# Patient Record
Sex: FEMALE | Race: BLACK OR AFRICAN AMERICAN | NOT HISPANIC OR LATINO | Employment: OTHER | URBAN - METROPOLITAN AREA
[De-identification: names, ages, dates, MRNs, and addresses within clinical notes are randomized per-mention and may not be internally consistent; named-entity substitution may affect disease eponyms.]

---

## 2023-11-27 ENCOUNTER — OFFICE VISIT (OUTPATIENT)
Dept: PRIMARY CARE CLINIC | Facility: CLINIC | Age: 68
End: 2023-11-27
Payer: MEDICARE

## 2023-11-27 VITALS
RESPIRATION RATE: 19 BRPM | OXYGEN SATURATION: 96 % | WEIGHT: 146.19 LBS | SYSTOLIC BLOOD PRESSURE: 148 MMHG | HEIGHT: 62 IN | DIASTOLIC BLOOD PRESSURE: 70 MMHG | HEART RATE: 60 BPM | BODY MASS INDEX: 26.9 KG/M2

## 2023-11-27 DIAGNOSIS — Z95.5 HISTORY OF CORONARY ARTERY STENT PLACEMENT: ICD-10-CM

## 2023-11-27 DIAGNOSIS — I10 ESSENTIAL HYPERTENSION: ICD-10-CM

## 2023-11-27 DIAGNOSIS — E74.819 DISORDERS OF GLUCOSE TRANSPORT, UNSPECIFIED: ICD-10-CM

## 2023-11-27 DIAGNOSIS — E78.00 HYPERCHOLESTEROLEMIA: ICD-10-CM

## 2023-11-27 DIAGNOSIS — Z23 NEED FOR VACCINATION: ICD-10-CM

## 2023-11-27 DIAGNOSIS — Z76.89 ENCOUNTER TO ESTABLISH CARE: Primary | ICD-10-CM

## 2023-11-27 DIAGNOSIS — Z11.4 ENCOUNTER FOR SCREENING FOR HIV: ICD-10-CM

## 2023-11-27 DIAGNOSIS — Z11.59 NEED FOR HEPATITIS C SCREENING TEST: ICD-10-CM

## 2023-11-27 DIAGNOSIS — G57.11 MERALGIA PARAESTHETICA, RIGHT: ICD-10-CM

## 2023-11-27 PROBLEM — M51.369 DEGENERATION OF LUMBAR INTERVERTEBRAL DISC: Status: ACTIVE | Noted: 2022-10-12

## 2023-11-27 PROBLEM — M51.36 DEGENERATION OF LUMBAR INTERVERTEBRAL DISC: Status: ACTIVE | Noted: 2022-10-12

## 2023-11-27 PROBLEM — I25.9 ISCHEMIC HEART DISEASE: Status: ACTIVE | Noted: 2021-11-11

## 2023-11-27 PROBLEM — M25.561 PAIN IN RIGHT KNEE: Status: ACTIVE | Noted: 2022-11-10

## 2023-11-27 PROBLEM — E55.9 VITAMIN D DEFICIENCY: Status: ACTIVE | Noted: 2022-10-09

## 2023-11-27 PROCEDURE — 93005 EKG 12-LEAD: ICD-10-PCS | Mod: S$GLB,,, | Performed by: STUDENT IN AN ORGANIZED HEALTH CARE EDUCATION/TRAINING PROGRAM

## 2023-11-27 PROCEDURE — G0008 FLU VACCINE - QUADRIVALENT - ADJUVANTED: ICD-10-PCS | Mod: S$GLB,,, | Performed by: STUDENT IN AN ORGANIZED HEALTH CARE EDUCATION/TRAINING PROGRAM

## 2023-11-27 PROCEDURE — 99204 OFFICE O/P NEW MOD 45 MIN: CPT | Mod: S$GLB,,, | Performed by: STUDENT IN AN ORGANIZED HEALTH CARE EDUCATION/TRAINING PROGRAM

## 2023-11-27 PROCEDURE — 1159F MED LIST DOCD IN RCRD: CPT | Mod: CPTII,S$GLB,, | Performed by: STUDENT IN AN ORGANIZED HEALTH CARE EDUCATION/TRAINING PROGRAM

## 2023-11-27 PROCEDURE — 3288F PR FALLS RISK ASSESSMENT DOCUMENTED: ICD-10-PCS | Mod: CPTII,S$GLB,, | Performed by: STUDENT IN AN ORGANIZED HEALTH CARE EDUCATION/TRAINING PROGRAM

## 2023-11-27 PROCEDURE — 3077F PR MOST RECENT SYSTOLIC BLOOD PRESSURE >= 140 MM HG: ICD-10-PCS | Mod: CPTII,S$GLB,, | Performed by: STUDENT IN AN ORGANIZED HEALTH CARE EDUCATION/TRAINING PROGRAM

## 2023-11-27 PROCEDURE — 3288F FALL RISK ASSESSMENT DOCD: CPT | Mod: CPTII,S$GLB,, | Performed by: STUDENT IN AN ORGANIZED HEALTH CARE EDUCATION/TRAINING PROGRAM

## 2023-11-27 PROCEDURE — 3078F DIAST BP <80 MM HG: CPT | Mod: CPTII,S$GLB,, | Performed by: STUDENT IN AN ORGANIZED HEALTH CARE EDUCATION/TRAINING PROGRAM

## 2023-11-27 PROCEDURE — 93010 ELECTROCARDIOGRAM REPORT: CPT | Mod: S$GLB,,, | Performed by: INTERNAL MEDICINE

## 2023-11-27 PROCEDURE — 93005 ELECTROCARDIOGRAM TRACING: CPT | Mod: S$GLB,,, | Performed by: STUDENT IN AN ORGANIZED HEALTH CARE EDUCATION/TRAINING PROGRAM

## 2023-11-27 PROCEDURE — G0008 ADMIN INFLUENZA VIRUS VAC: HCPCS | Mod: S$GLB,,, | Performed by: STUDENT IN AN ORGANIZED HEALTH CARE EDUCATION/TRAINING PROGRAM

## 2023-11-27 PROCEDURE — 3008F PR BODY MASS INDEX (BMI) DOCUMENTED: ICD-10-PCS | Mod: CPTII,S$GLB,, | Performed by: STUDENT IN AN ORGANIZED HEALTH CARE EDUCATION/TRAINING PROGRAM

## 2023-11-27 PROCEDURE — 3078F PR MOST RECENT DIASTOLIC BLOOD PRESSURE < 80 MM HG: ICD-10-PCS | Mod: CPTII,S$GLB,, | Performed by: STUDENT IN AN ORGANIZED HEALTH CARE EDUCATION/TRAINING PROGRAM

## 2023-11-27 PROCEDURE — 99204 PR OFFICE/OUTPT VISIT, NEW, LEVL IV, 45-59 MIN: ICD-10-PCS | Mod: S$GLB,,, | Performed by: STUDENT IN AN ORGANIZED HEALTH CARE EDUCATION/TRAINING PROGRAM

## 2023-11-27 PROCEDURE — 99999 PR PBB SHADOW E&M-NEW PATIENT-LVL IV: ICD-10-PCS | Mod: PBBFAC,,, | Performed by: STUDENT IN AN ORGANIZED HEALTH CARE EDUCATION/TRAINING PROGRAM

## 2023-11-27 PROCEDURE — 90694 VACC AIIV4 NO PRSRV 0.5ML IM: CPT | Mod: S$GLB,,, | Performed by: STUDENT IN AN ORGANIZED HEALTH CARE EDUCATION/TRAINING PROGRAM

## 2023-11-27 PROCEDURE — 1101F PR PT FALLS ASSESS DOC 0-1 FALLS W/OUT INJ PAST YR: ICD-10-PCS | Mod: CPTII,S$GLB,, | Performed by: STUDENT IN AN ORGANIZED HEALTH CARE EDUCATION/TRAINING PROGRAM

## 2023-11-27 PROCEDURE — 3008F BODY MASS INDEX DOCD: CPT | Mod: CPTII,S$GLB,, | Performed by: STUDENT IN AN ORGANIZED HEALTH CARE EDUCATION/TRAINING PROGRAM

## 2023-11-27 PROCEDURE — 1160F PR REVIEW ALL MEDS BY PRESCRIBER/CLIN PHARMACIST DOCUMENTED: ICD-10-PCS | Mod: CPTII,S$GLB,, | Performed by: STUDENT IN AN ORGANIZED HEALTH CARE EDUCATION/TRAINING PROGRAM

## 2023-11-27 PROCEDURE — 1160F RVW MEDS BY RX/DR IN RCRD: CPT | Mod: CPTII,S$GLB,, | Performed by: STUDENT IN AN ORGANIZED HEALTH CARE EDUCATION/TRAINING PROGRAM

## 2023-11-27 PROCEDURE — 93010 EKG 12-LEAD: ICD-10-PCS | Mod: S$GLB,,, | Performed by: INTERNAL MEDICINE

## 2023-11-27 PROCEDURE — 1101F PT FALLS ASSESS-DOCD LE1/YR: CPT | Mod: CPTII,S$GLB,, | Performed by: STUDENT IN AN ORGANIZED HEALTH CARE EDUCATION/TRAINING PROGRAM

## 2023-11-27 PROCEDURE — 99999 PR PBB SHADOW E&M-NEW PATIENT-LVL IV: CPT | Mod: PBBFAC,,, | Performed by: STUDENT IN AN ORGANIZED HEALTH CARE EDUCATION/TRAINING PROGRAM

## 2023-11-27 PROCEDURE — 1159F PR MEDICATION LIST DOCUMENTED IN MEDICAL RECORD: ICD-10-PCS | Mod: CPTII,S$GLB,, | Performed by: STUDENT IN AN ORGANIZED HEALTH CARE EDUCATION/TRAINING PROGRAM

## 2023-11-27 PROCEDURE — 3077F SYST BP >= 140 MM HG: CPT | Mod: CPTII,S$GLB,, | Performed by: STUDENT IN AN ORGANIZED HEALTH CARE EDUCATION/TRAINING PROGRAM

## 2023-11-27 PROCEDURE — 90694 FLU VACCINE - QUADRIVALENT - ADJUVANTED: ICD-10-PCS | Mod: S$GLB,,, | Performed by: STUDENT IN AN ORGANIZED HEALTH CARE EDUCATION/TRAINING PROGRAM

## 2023-11-27 RX ORDER — METOPROLOL SUCCINATE 25 MG/1
25 TABLET, EXTENDED RELEASE ORAL DAILY
COMMUNITY
End: 2023-11-27 | Stop reason: SDUPTHER

## 2023-11-27 RX ORDER — ATORVASTATIN CALCIUM 80 MG/1
80 TABLET, FILM COATED ORAL DAILY
COMMUNITY
Start: 2023-09-28 | End: 2023-11-27 | Stop reason: SDUPTHER

## 2023-11-27 RX ORDER — CLOPIDOGREL BISULFATE 75 MG/1
75 TABLET ORAL DAILY
COMMUNITY
Start: 2023-09-28 | End: 2023-11-27 | Stop reason: SDUPTHER

## 2023-11-27 RX ORDER — AMLODIPINE BESYLATE 5 MG/1
5 TABLET ORAL DAILY
Qty: 30 TABLET | Refills: 11 | Status: SHIPPED | OUTPATIENT
Start: 2023-11-27 | End: 2024-02-23 | Stop reason: SDUPTHER

## 2023-11-27 RX ORDER — CLOPIDOGREL BISULFATE 75 MG/1
75 TABLET ORAL DAILY
Qty: 90 TABLET | Refills: 3 | Status: SHIPPED | OUTPATIENT
Start: 2023-11-27

## 2023-11-27 RX ORDER — ISOSORBIDE MONONITRATE 30 MG/1
30 TABLET, EXTENDED RELEASE ORAL EVERY MORNING
Qty: 90 TABLET | Refills: 3 | Status: SHIPPED | OUTPATIENT
Start: 2023-11-27 | End: 2023-11-30 | Stop reason: SDUPTHER

## 2023-11-27 RX ORDER — AMLODIPINE BESYLATE 2.5 MG/1
2.5 TABLET ORAL DAILY
COMMUNITY
Start: 2023-09-06 | End: 2023-11-27

## 2023-11-27 RX ORDER — ATORVASTATIN CALCIUM 80 MG/1
80 TABLET, FILM COATED ORAL DAILY
Qty: 90 TABLET | Refills: 3 | Status: SHIPPED | OUTPATIENT
Start: 2023-11-27

## 2023-11-27 RX ORDER — NAPROXEN SODIUM 220 MG/1
81 TABLET, FILM COATED ORAL DAILY
COMMUNITY

## 2023-11-27 RX ORDER — RANOLAZINE 500 MG/1
500 TABLET, EXTENDED RELEASE ORAL DAILY
COMMUNITY
Start: 2023-11-01 | End: 2024-02-23 | Stop reason: SDUPTHER

## 2023-11-27 RX ORDER — METOPROLOL SUCCINATE 25 MG/1
25 TABLET, EXTENDED RELEASE ORAL DAILY
Qty: 90 TABLET | Refills: 3 | Status: SHIPPED | OUTPATIENT
Start: 2023-11-27

## 2023-11-27 RX ORDER — ISOSORBIDE MONONITRATE 30 MG/1
30 TABLET, EXTENDED RELEASE ORAL EVERY MORNING
COMMUNITY
Start: 2023-11-16 | End: 2023-11-27 | Stop reason: SDUPTHER

## 2023-11-27 NOTE — PATIENT INSTRUCTIONS
Est Care:   - 68-year-old female presenting to establish care.  Has history of coronary artery disease in 2021, requiring 4 stents.  Chronic hypertension.  Chronic angina.    HTN:   - blood pressure slightly elevated today.  Would recommend patient taking increase dose of amlodipine from 2.5 mg daily to 5 mg daily.  Other medicine should be continued unchanged.  Will recheck at nursing clinic visit 2 weeks as well as at our next follow-up visit.    Hx ischemic heart disease, CAD:   - patient had history of heart attack, with stents in September of 2021, x1.  Then October of 2021, x3.   - has been taking baby aspirin, atorvastatin, amlodipine, metoprolol, Imdur, Plavix and Ranexa daily.  Continue with current plan.  Needs to have cardiologist.  Referral placed.  Patient due for EKG which has been ordered today.  Additionally should get cholesterol, blood counts, kidney liver function and thyroid which have all been ordered.    Screening:    - patient had Cologuard 2 and half years ago, will need to repeat early in 2024.  Additionally will be due for repeat mammogram fairly soon.

## 2023-11-27 NOTE — PROGRESS NOTES
Verified pt ID using name and . NDKA. Administered influenza in right deltoid per physician order using aseptic technique.  Pt tolerated well with no adverse reactions noted.

## 2023-11-27 NOTE — PROGRESS NOTES
"Subjective:           Patient ID: Betty Farr is a 68 y.o. female who presents today with a chief complaint of [facility]  .    Chief Complaint:   Establish Care      History of Present Illness:    Betty Farr is a 68 y.o. female who presents today with a chief complaint of [facility]  .    69yo female presenting to [facility].    Hx of mild heart attack and on workup was found to have significant CAD.    Takes Amlodipine 2.5mg daily, Metoprolol succinate 25mg daily, IMDUR 30mg daily.   Also ASA 81mg, Atorvastatin 80mg daily, and Plavix 75mg daily.     Taking Ranexa for chest pain/angina since MI.    Had 4 stents placed, 1 in Sept 2021, then 3 more in Oct 2021.      Review of Systems   Constitutional:  Negative for activity change, fatigue, fever and unexpected weight change.   HENT:  Negative for congestion, nosebleeds, sinus pressure and sneezing.    Respiratory:  Negative for cough, shortness of breath and wheezing.    Cardiovascular:  Negative for chest pain, palpitations and leg swelling.   Gastrointestinal:  Negative for abdominal distention, constipation, diarrhea and nausea.   Genitourinary:  Negative for difficulty urinating and dysuria.   Musculoskeletal:  Negative for back pain and gait problem.   Skin:  Negative for pallor and rash.   Neurological:  Negative for weakness, numbness and headaches.   Psychiatric/Behavioral:  Negative for agitation. The patient is not nervous/anxious.            Objective:        Vitals:    11/27/23 1029 11/27/23 1101   BP: (!) 144/70 (!) 148/70   BP Location: Left arm Right arm   Patient Position: Sitting Sitting   BP Method: Medium (Manual) Medium (Manual)   Pulse: 60    Resp: 19    SpO2: 96%    Weight: 66.3 kg (146 lb 2.6 oz)    Height: 5' 2" (1.575 m)        Body mass index is 26.73 kg/m².      Physical Exam  Vitals reviewed.   Constitutional:       General: She is not in acute distress.     Appearance: Normal appearance. She is normal weight.   HENT:      " "Head: Normocephalic and atraumatic.      Right Ear: External ear normal.      Left Ear: External ear normal.      Nose: No rhinorrhea.      Mouth/Throat:      Mouth: Mucous membranes are moist.   Eyes:      Extraocular Movements: Extraocular movements intact.      Conjunctiva/sclera: Conjunctivae normal.   Cardiovascular:      Rate and Rhythm: Normal rate and regular rhythm.      Heart sounds: No murmur heard.  Pulmonary:      Effort: Pulmonary effort is normal. No respiratory distress.   Abdominal:      Tenderness: There is no right CVA tenderness or left CVA tenderness.   Musculoskeletal:      Right lower leg: No edema.      Left lower leg: No edema.   Lymphadenopathy:      Cervical: No cervical adenopathy.   Skin:     Capillary Refill: Capillary refill takes less than 2 seconds.      Coloration: Skin is not jaundiced.      Findings: No bruising.   Neurological:      General: No focal deficit present.      Mental Status: She is alert and oriented to person, place, and time.      Motor: No weakness.      Gait: Gait normal.   Psychiatric:         Mood and Affect: Mood normal.             No results found for: "NA", "K", "CL", "CO2", "BUN", "CREATININE", "GLUCOSE", "ANIONGAP"  No results found for: "HGBA1C"  No results found for: "BNP", "BNPTRIAGEBLO"    No results found for: "WBC", "HGB", "HCT", "PLT", "GRAN"  No results found for: "CHOL", "HDL", "LDLCALC", "TRIG"       Current Outpatient Medications:     aspirin 81 MG Chew, Take 81 mg by mouth once daily., Disp: , Rfl:     ranolazine (RANEXA) 500 MG Tb12, Take 500 mg by mouth Daily., Disp: , Rfl:     amLODIPine (NORVASC) 5 MG tablet, Take 1 tablet (5 mg total) by mouth once daily., Disp: 30 tablet, Rfl: 11    atorvastatin (LIPITOR) 80 MG tablet, Take 1 tablet (80 mg total) by mouth once daily., Disp: 90 tablet, Rfl: 3    clopidogreL (PLAVIX) 75 mg tablet, Take 1 tablet (75 mg total) by mouth once daily., Disp: 90 tablet, Rfl: 3    isosorbide mononitrate (IMDUR) 30 " MG 24 hr tablet, Take 1 tablet (30 mg total) by mouth every morning., Disp: 90 tablet, Rfl: 3    metoprolol succinate (TOPROL-XL) 25 MG 24 hr tablet, Take 1 tablet (25 mg total) by mouth once daily., Disp: 90 tablet, Rfl: 3     Outpatient Encounter Medications as of 11/27/2023   Medication Sig Dispense Refill    aspirin 81 MG Chew Take 81 mg by mouth once daily.      ranolazine (RANEXA) 500 MG Tb12 Take 500 mg by mouth Daily.      [DISCONTINUED] amLODIPine (NORVASC) 2.5 MG tablet Take 2.5 mg by mouth once daily.      [DISCONTINUED] atorvastatin (LIPITOR) 80 MG tablet Take 80 mg by mouth once daily.      [DISCONTINUED] clopidogreL (PLAVIX) 75 mg tablet Take 75 mg by mouth once daily.      [DISCONTINUED] isosorbide mononitrate (IMDUR) 30 MG 24 hr tablet Take 30 mg by mouth every morning.      [DISCONTINUED] metoprolol succinate (TOPROL-XL) 25 MG 24 hr tablet Take 25 mg by mouth once daily.      amLODIPine (NORVASC) 5 MG tablet Take 1 tablet (5 mg total) by mouth once daily. 30 tablet 11    atorvastatin (LIPITOR) 80 MG tablet Take 1 tablet (80 mg total) by mouth once daily. 90 tablet 3    clopidogreL (PLAVIX) 75 mg tablet Take 1 tablet (75 mg total) by mouth once daily. 90 tablet 3    isosorbide mononitrate (IMDUR) 30 MG 24 hr tablet Take 1 tablet (30 mg total) by mouth every morning. 90 tablet 3    metoprolol succinate (TOPROL-XL) 25 MG 24 hr tablet Take 1 tablet (25 mg total) by mouth once daily. 90 tablet 3     No facility-administered encounter medications on file as of 11/27/2023.          Assessment:       1. Encounter to establish care    2. Essential hypertension    3. History of coronary artery stent placement    4. Hypercholesterolemia    5. Meralgia paraesthetica, right    6. Need for vaccination    7. Need for hepatitis C screening test    8. Encounter for screening for HIV    9. Disorders of glucose transport, unspecified           Plan:       Encounter to establish care    Essential hypertension  -      amLODIPine (NORVASC) 5 MG tablet; Take 1 tablet (5 mg total) by mouth once daily.  Dispense: 30 tablet; Refill: 11  -     Ambulatory referral/consult to Cardiology; Future; Expected date: 12/04/2023  -     atorvastatin (LIPITOR) 80 MG tablet; Take 1 tablet (80 mg total) by mouth once daily.  Dispense: 90 tablet; Refill: 3  -     clopidogreL (PLAVIX) 75 mg tablet; Take 1 tablet (75 mg total) by mouth once daily.  Dispense: 90 tablet; Refill: 3  -     metoprolol succinate (TOPROL-XL) 25 MG 24 hr tablet; Take 1 tablet (25 mg total) by mouth once daily.  Dispense: 90 tablet; Refill: 3  -     isosorbide mononitrate (IMDUR) 30 MG 24 hr tablet; Take 1 tablet (30 mg total) by mouth every morning.  Dispense: 90 tablet; Refill: 3  -     CBC Auto Differential; Future; Expected date: 11/27/2023  -     Comprehensive Metabolic Panel; Future; Expected date: 11/27/2023  -     Lipid Panel; Future; Expected date: 11/27/2023  -     Hemoglobin A1C; Future; Expected date: 11/27/2023  -     TSH; Future; Expected date: 11/27/2023  -     T4, Free; Future; Expected date: 11/27/2023  -     EKG 12-lead    History of coronary artery stent placement  -     amLODIPine (NORVASC) 5 MG tablet; Take 1 tablet (5 mg total) by mouth once daily.  Dispense: 30 tablet; Refill: 11  -     Ambulatory referral/consult to Cardiology; Future; Expected date: 12/04/2023  -     atorvastatin (LIPITOR) 80 MG tablet; Take 1 tablet (80 mg total) by mouth once daily.  Dispense: 90 tablet; Refill: 3  -     clopidogreL (PLAVIX) 75 mg tablet; Take 1 tablet (75 mg total) by mouth once daily.  Dispense: 90 tablet; Refill: 3  -     metoprolol succinate (TOPROL-XL) 25 MG 24 hr tablet; Take 1 tablet (25 mg total) by mouth once daily.  Dispense: 90 tablet; Refill: 3  -     isosorbide mononitrate (IMDUR) 30 MG 24 hr tablet; Take 1 tablet (30 mg total) by mouth every morning.  Dispense: 90 tablet; Refill: 3  -     CBC Auto Differential; Future; Expected date: 11/27/2023  -      Comprehensive Metabolic Panel; Future; Expected date: 11/27/2023  -     Lipid Panel; Future; Expected date: 11/27/2023  -     Hemoglobin A1C; Future; Expected date: 11/27/2023  -     TSH; Future; Expected date: 11/27/2023  -     T4, Free; Future; Expected date: 11/27/2023  -     EKG 12-lead    Hypercholesterolemia  -     atorvastatin (LIPITOR) 80 MG tablet; Take 1 tablet (80 mg total) by mouth once daily.  Dispense: 90 tablet; Refill: 3  -     clopidogreL (PLAVIX) 75 mg tablet; Take 1 tablet (75 mg total) by mouth once daily.  Dispense: 90 tablet; Refill: 3  -     metoprolol succinate (TOPROL-XL) 25 MG 24 hr tablet; Take 1 tablet (25 mg total) by mouth once daily.  Dispense: 90 tablet; Refill: 3  -     isosorbide mononitrate (IMDUR) 30 MG 24 hr tablet; Take 1 tablet (30 mg total) by mouth every morning.  Dispense: 90 tablet; Refill: 3    Meralgia paraesthetica, right    Need for vaccination  -     Influenza (FLUAD) - Quadrivalent (Adjuvanted) *Preferred* (65+) (PF)    Need for hepatitis C screening test  -     Hepatitis C Antibody; Future; Expected date: 11/27/2023    Encounter for screening for HIV  -     HIV 1/2 Ag/Ab (4th Gen); Future; Expected date: 11/27/2023    Disorders of glucose transport, unspecified  -     Hemoglobin A1C; Future; Expected date: 11/27/2023               Est Care:   - 68-year-old female presenting to [facility] care.  Has history of coronary artery disease in 2021, requiring 4 stents.  Chronic hypertension.  Chronic angina.    HTN:   - blood pressure slightly elevated today.  Would recommend patient taking increase dose of amlodipine from 2.5 mg daily to 5 mg daily.  Other medicine should be continued unchanged.  Will recheck at nursing clinic visit 2 weeks as well as at our next follow-up visit.    Hx ischemic heart disease, CAD:   - patient had history of heart attack, with stents in September of 2021, x1.  Then October of 2021, x3.   - has been taking baby aspirin, atorvastatin,  amlodipine, metoprolol, Imdur, Plavix and Ranexa daily.  Continue with current plan.  Needs to have cardiologist.  Referral placed.  Patient due for EKG which has been ordered today.  Additionally should get cholesterol, blood counts, kidney liver function and thyroid which have all been ordered.    Screening:    - patient had Cologuard 2 and half years ago, will need to repeat early in 2024.  Additionally will be due for repeat mammogram fairly soon.

## 2023-11-29 DIAGNOSIS — Z78.0 MENOPAUSE: ICD-10-CM

## 2023-11-30 ENCOUNTER — OFFICE VISIT (OUTPATIENT)
Dept: CARDIOLOGY | Facility: CLINIC | Age: 68
End: 2023-11-30
Payer: MEDICARE

## 2023-11-30 VITALS
SYSTOLIC BLOOD PRESSURE: 116 MMHG | DIASTOLIC BLOOD PRESSURE: 55 MMHG | BODY MASS INDEX: 26.65 KG/M2 | WEIGHT: 144.81 LBS | OXYGEN SATURATION: 96 % | HEIGHT: 62 IN | HEART RATE: 68 BPM

## 2023-11-30 DIAGNOSIS — R07.2 PRECORDIAL PAIN: Primary | ICD-10-CM

## 2023-11-30 DIAGNOSIS — I10 ESSENTIAL HYPERTENSION: ICD-10-CM

## 2023-11-30 DIAGNOSIS — R00.2 PALPITATIONS: ICD-10-CM

## 2023-11-30 DIAGNOSIS — E78.00 HYPERCHOLESTEROLEMIA: ICD-10-CM

## 2023-11-30 DIAGNOSIS — Z95.5 HISTORY OF CORONARY ARTERY STENT PLACEMENT: ICD-10-CM

## 2023-11-30 PROCEDURE — 1159F MED LIST DOCD IN RCRD: CPT | Mod: CPTII,S$GLB,, | Performed by: NURSE PRACTITIONER

## 2023-11-30 PROCEDURE — 3044F HG A1C LEVEL LT 7.0%: CPT | Mod: CPTII,S$GLB,, | Performed by: NURSE PRACTITIONER

## 2023-11-30 PROCEDURE — 1160F RVW MEDS BY RX/DR IN RCRD: CPT | Mod: CPTII,S$GLB,, | Performed by: NURSE PRACTITIONER

## 2023-11-30 PROCEDURE — 99204 PR OFFICE/OUTPT VISIT, NEW, LEVL IV, 45-59 MIN: ICD-10-PCS | Mod: S$GLB,,, | Performed by: NURSE PRACTITIONER

## 2023-11-30 PROCEDURE — 3008F BODY MASS INDEX DOCD: CPT | Mod: CPTII,S$GLB,, | Performed by: NURSE PRACTITIONER

## 2023-11-30 PROCEDURE — 3074F SYST BP LT 130 MM HG: CPT | Mod: CPTII,S$GLB,, | Performed by: NURSE PRACTITIONER

## 2023-11-30 PROCEDURE — 1160F PR REVIEW ALL MEDS BY PRESCRIBER/CLIN PHARMACIST DOCUMENTED: ICD-10-PCS | Mod: CPTII,S$GLB,, | Performed by: NURSE PRACTITIONER

## 2023-11-30 PROCEDURE — 3044F PR MOST RECENT HEMOGLOBIN A1C LEVEL <7.0%: ICD-10-PCS | Mod: CPTII,S$GLB,, | Performed by: NURSE PRACTITIONER

## 2023-11-30 PROCEDURE — 3288F PR FALLS RISK ASSESSMENT DOCUMENTED: ICD-10-PCS | Mod: CPTII,S$GLB,, | Performed by: NURSE PRACTITIONER

## 2023-11-30 PROCEDURE — 3078F DIAST BP <80 MM HG: CPT | Mod: CPTII,S$GLB,, | Performed by: NURSE PRACTITIONER

## 2023-11-30 PROCEDURE — 1159F PR MEDICATION LIST DOCUMENTED IN MEDICAL RECORD: ICD-10-PCS | Mod: CPTII,S$GLB,, | Performed by: NURSE PRACTITIONER

## 2023-11-30 PROCEDURE — 3078F PR MOST RECENT DIASTOLIC BLOOD PRESSURE < 80 MM HG: ICD-10-PCS | Mod: CPTII,S$GLB,, | Performed by: NURSE PRACTITIONER

## 2023-11-30 PROCEDURE — 1101F PT FALLS ASSESS-DOCD LE1/YR: CPT | Mod: CPTII,S$GLB,, | Performed by: NURSE PRACTITIONER

## 2023-11-30 PROCEDURE — 99999 PR PBB SHADOW E&M-EST. PATIENT-LVL IV: ICD-10-PCS | Mod: PBBFAC,,, | Performed by: NURSE PRACTITIONER

## 2023-11-30 PROCEDURE — 3288F FALL RISK ASSESSMENT DOCD: CPT | Mod: CPTII,S$GLB,, | Performed by: NURSE PRACTITIONER

## 2023-11-30 PROCEDURE — 3074F PR MOST RECENT SYSTOLIC BLOOD PRESSURE < 130 MM HG: ICD-10-PCS | Mod: CPTII,S$GLB,, | Performed by: NURSE PRACTITIONER

## 2023-11-30 PROCEDURE — 1101F PR PT FALLS ASSESS DOC 0-1 FALLS W/OUT INJ PAST YR: ICD-10-PCS | Mod: CPTII,S$GLB,, | Performed by: NURSE PRACTITIONER

## 2023-11-30 PROCEDURE — 3008F PR BODY MASS INDEX (BMI) DOCUMENTED: ICD-10-PCS | Mod: CPTII,S$GLB,, | Performed by: NURSE PRACTITIONER

## 2023-11-30 PROCEDURE — 99999 PR PBB SHADOW E&M-EST. PATIENT-LVL IV: CPT | Mod: PBBFAC,,, | Performed by: NURSE PRACTITIONER

## 2023-11-30 PROCEDURE — 99204 OFFICE O/P NEW MOD 45 MIN: CPT | Mod: S$GLB,,, | Performed by: NURSE PRACTITIONER

## 2023-11-30 RX ORDER — ISOSORBIDE MONONITRATE 30 MG/1
60 TABLET, EXTENDED RELEASE ORAL EVERY MORNING
Qty: 90 TABLET | Refills: 3 | Status: SHIPPED | OUTPATIENT
Start: 2023-11-30

## 2023-11-30 NOTE — PROGRESS NOTES
Cardiology    11/30/2023  9:15 AM    Problem list  Patient Active Problem List   Diagnosis    Degeneration of lumbar intervertebral disc    Essential hypertension    History of coronary artery stent placement    Hypercholesterolemia    Vitamin D deficiency    Pain in right knee    Ischemic heart disease       CC:  Chest pain, establish care    HPI:  Left sided lightening like pains that happen when laying down.  Has diaphoresis associated. No swelling in legs, 3 pillows is her average which is for comfort. No ETOH, former tobacco user, no caffeine. Had a stent placed in [place] and recently moved back to the [facility]. Remains on DAPT.    Medications  Current Outpatient Medications   Medication Sig Dispense Refill    amLODIPine (NORVASC) 5 MG tablet Take 1 tablet (5 mg total) by mouth once daily. 30 tablet 11    aspirin 81 MG Chew Take 81 mg by mouth once daily.      atorvastatin (LIPITOR) 80 MG tablet Take 1 tablet (80 mg total) by mouth once daily. 90 tablet 3    clopidogreL (PLAVIX) 75 mg tablet Take 1 tablet (75 mg total) by mouth once daily. 90 tablet 3    isosorbide mononitrate (IMDUR) 30 MG 24 hr tablet Take 1 tablet (30 mg total) by mouth every morning. 90 tablet 3    metoprolol succinate (TOPROL-XL) 25 MG 24 hr tablet Take 1 tablet (25 mg total) by mouth once daily. 90 tablet 3    ranolazine (RANEXA) 500 MG Tb12 Take 500 mg by mouth Daily.       No current facility-administered medications for this visit.      Prior to Admission medications    Medication Sig Start Date End Date Taking? Authorizing Provider   amLODIPine (NORVASC) 5 MG tablet Take 1 tablet (5 mg total) by mouth once daily. 11/27/23 11/26/24 Yes Harsha Araujo MD   aspirin 81 MG Chew Take 81 mg by mouth once daily.   Yes Provider, Historical   atorvastatin (LIPITOR) 80 MG tablet Take 1 tablet (80 mg total) by mouth once daily. 11/27/23  Yes Harsha Araujo MD   clopidogreL (PLAVIX) 75 mg tablet Take 1 tablet (75 mg total) by  mouth once daily. 23  Yes Harsha Araujo MD   isosorbide mononitrate (IMDUR) 30 MG 24 hr tablet Take 1 tablet (30 mg total) by mouth every morning. 23  Yes Harsha Araujo MD   metoprolol succinate (TOPROL-XL) 25 MG 24 hr tablet Take 1 tablet (25 mg total) by mouth once daily. 23  Yes Harsha Araujo MD   ranolazine (RANEXA) 500 MG Tb12 Take 500 mg by mouth Daily. 23  Yes Provider, Historical         History  No past medical history on file.  Past Surgical History:   Procedure Laterality Date     SECTION      x3    CORONARY STENT PLACEMENT      4 stents, Sept 2021 x1,  Oct 2021 x 3    HYSTERECTOMY       Social History     Socioeconomic History    Marital status:    Tobacco Use    Smoking status: Former     Current packs/day: 0.00     Average packs/day: 1 pack/day for 47.7 years (47.7 ttl pk-yrs)     Types: Cigarettes     Start date: 1974     Quit date: 2021     Years since quittin.2     Passive exposure: Never    Smokeless tobacco: Never    Tobacco comments:     Stopped smoking in    Substance and Sexual Activity    Alcohol use: Never    Drug use: Never    Sexual activity: Not Currently         Allergies  Review of patient's allergies indicates:   Allergen Reactions    Penicillins          Review of Systems   Review of Systems   Constitutional: Negative for diaphoresis and malaise/fatigue.   HENT: Negative.     Cardiovascular:  Positive for chest pain, irregular heartbeat and palpitations. Negative for claudication, dyspnea on exertion, leg swelling, near-syncope, orthopnea, paroxysmal nocturnal dyspnea and syncope.   Respiratory:  Negative for shortness of breath.    Endocrine: Negative for polydipsia, polyphagia and polyuria.   Hematologic/Lymphatic: Does not bruise/bleed easily.   Gastrointestinal:  Negative for bloating, nausea and vomiting.   Genitourinary: Negative.    Neurological:  Negative for excessive daytime sleepiness, dizziness,  light-headedness, loss of balance and weakness.   Psychiatric/Behavioral:  The patient is not nervous/anxious.    Allergic/Immunologic: Negative.          Physical Exam  Wt Readings from Last 1 Encounters:   11/30/23 65.7 kg (144 lb 13.5 oz)     BP Readings from Last 3 Encounters:   11/30/23 (!) 116/55   11/27/23 (!) 148/70     Pulse Readings from Last 1 Encounters:   11/30/23 68     Body mass index is 26.49 kg/m².    Physical Exam  Vitals and nursing note reviewed.   Constitutional:       Appearance: Normal appearance.   HENT:      Head: Normocephalic and atraumatic.      Mouth/Throat:      Mouth: Mucous membranes are moist.   Eyes:      Pupils: Pupils are equal, round, and reactive to light.   Cardiovascular:      Rate and Rhythm: Normal rate and regular rhythm.      Pulses:           Radial pulses are 2+ on the right side and 2+ on the left side.        Dorsalis pedis pulses are 2+ on the right side and 2+ on the left side.        Posterior tibial pulses are 2+ on the right side and 2+ on the left side.      Heart sounds: No murmur heard.  Pulmonary:      Effort: Pulmonary effort is normal. No respiratory distress.      Breath sounds: Normal breath sounds.   Abdominal:      General: There is no distension.      Tenderness: There is no abdominal tenderness.   Musculoskeletal:      Cervical back: Normal range of motion.      Right lower leg: No edema.      Left lower leg: No edema.   Skin:     General: Skin is warm and dry.      Findings: No erythema.   Neurological:      General: No focal deficit present.      Mental Status: She is alert.   Psychiatric:         Mood and Affect: Mood normal.         Behavior: Behavior normal.       Problem List Items Addressed This Visit          Cardiac/Vascular    Essential hypertension    Overview     Stable  Continue current medications  Monitor         Current Assessment & Plan     As above         Relevant Medications    isosorbide mononitrate (IMDUR) 30 MG 24 hr tablet     Other Relevant Orders    Nuclear Stress Test    History of coronary artery stent placement    Overview     Await records, remains on DAPT         Current Assessment & Plan     As above         Relevant Medications    isosorbide mononitrate (IMDUR) 30 MG 24 hr tablet    Other Relevant Orders    NM Myocardial Perfusion Spect Multi Exer    Nuclear Stress Test    Hypercholesterolemia    Relevant Medications    isosorbide mononitrate (IMDUR) 30 MG 24 hr tablet    Precordial pain - Primary    Overview     Significant CAD history with need for PCI  Increase Imdur dose to 60 mg  Await nuclear stress           Current Assessment & Plan     As above         Relevant Orders    NM Myocardial Perfusion Spect Multi Exer    Nuclear Stress Test     Other Visit Diagnoses       Palpitations        Relevant Orders    Holter monitor - 48 hour                        Follow Up  4 weeks      @Balbina Grijalva DNP

## 2023-12-01 PROBLEM — R07.2 PRECORDIAL PAIN: Status: ACTIVE | Noted: 2023-12-01

## 2023-12-04 ENCOUNTER — TELEPHONE (OUTPATIENT)
Dept: PRIMARY CARE CLINIC | Facility: CLINIC | Age: 68
End: 2023-12-04
Payer: MEDICARE

## 2023-12-04 NOTE — TELEPHONE ENCOUNTER
----- Message from Cesar Conde sent at 12/4/2023  1:50 PM CST -----  Contact: [phone]@[email or web address]  Good afternoon patient would like a call back she says she missed a call from someone in the office . Please give patient a call back [phone]

## 2023-12-11 ENCOUNTER — CLINICAL SUPPORT (OUTPATIENT)
Dept: PRIMARY CARE CLINIC | Facility: CLINIC | Age: 68
End: 2023-12-11
Payer: MEDICARE

## 2023-12-11 VITALS — SYSTOLIC BLOOD PRESSURE: 126 MMHG | OXYGEN SATURATION: 96 % | HEART RATE: 68 BPM | DIASTOLIC BLOOD PRESSURE: 60 MMHG

## 2023-12-11 DIAGNOSIS — I10 ESSENTIAL HYPERTENSION: Primary | ICD-10-CM

## 2023-12-11 PROCEDURE — 99999 PR PBB SHADOW E&M-EST. PATIENT-LVL I: ICD-10-PCS | Mod: PBBFAC,,,

## 2023-12-11 PROCEDURE — 99999 PR PBB SHADOW E&M-EST. PATIENT-LVL I: CPT | Mod: PBBFAC,,,

## 2023-12-11 NOTE — PROGRESS NOTES
Verified pt ID using name and . Obtained bp:126/60 , p, and sp02. Notified physician of results. Instructed pt to continue to take bp medication . Pt verbalized understanding

## 2024-01-02 ENCOUNTER — TELEPHONE (OUTPATIENT)
Dept: CARDIOLOGY | Facility: CLINIC | Age: 69
End: 2024-01-02
Payer: MEDICARE

## 2024-01-02 NOTE — TELEPHONE ENCOUNTER
----- Message from Balbina Grijalva DNP sent at 1/2/2024  1:24 PM CST -----  Please contact the patient and let them know that their results were fine and do not require any change in treatment.

## 2024-01-11 ENCOUNTER — TELEPHONE (OUTPATIENT)
Dept: CARDIOLOGY | Facility: CLINIC | Age: 69
End: 2024-01-11
Payer: MEDICARE

## 2024-01-11 ENCOUNTER — OFFICE VISIT (OUTPATIENT)
Dept: CARDIOLOGY | Facility: CLINIC | Age: 69
End: 2024-01-11
Payer: MEDICARE

## 2024-01-11 VITALS
DIASTOLIC BLOOD PRESSURE: 69 MMHG | WEIGHT: 142.75 LBS | SYSTOLIC BLOOD PRESSURE: 135 MMHG | BODY MASS INDEX: 26.27 KG/M2 | HEART RATE: 58 BPM | HEIGHT: 62 IN | OXYGEN SATURATION: 96 %

## 2024-01-11 DIAGNOSIS — R60.9 EDEMA, UNSPECIFIED TYPE: ICD-10-CM

## 2024-01-11 DIAGNOSIS — I10 ESSENTIAL HYPERTENSION: ICD-10-CM

## 2024-01-11 DIAGNOSIS — S40.022A CONTUSION OF LEFT UPPER EXTREMITY, INITIAL ENCOUNTER: ICD-10-CM

## 2024-01-11 DIAGNOSIS — T14.8XXA HEMATOMA: Primary | ICD-10-CM

## 2024-01-11 DIAGNOSIS — Z95.5 HISTORY OF CORONARY ARTERY STENT PLACEMENT: ICD-10-CM

## 2024-01-11 PROCEDURE — 3288F FALL RISK ASSESSMENT DOCD: CPT | Mod: CPTII,S$GLB,, | Performed by: NURSE PRACTITIONER

## 2024-01-11 PROCEDURE — 99999 PR PBB SHADOW E&M-EST. PATIENT-LVL IV: CPT | Mod: PBBFAC,,, | Performed by: NURSE PRACTITIONER

## 2024-01-11 PROCEDURE — 3078F DIAST BP <80 MM HG: CPT | Mod: CPTII,S$GLB,, | Performed by: NURSE PRACTITIONER

## 2024-01-11 PROCEDURE — 1159F MED LIST DOCD IN RCRD: CPT | Mod: CPTII,S$GLB,, | Performed by: NURSE PRACTITIONER

## 2024-01-11 PROCEDURE — 99212 OFFICE O/P EST SF 10 MIN: CPT | Mod: S$GLB,,, | Performed by: NURSE PRACTITIONER

## 2024-01-11 PROCEDURE — 1125F AMNT PAIN NOTED PAIN PRSNT: CPT | Mod: CPTII,S$GLB,, | Performed by: NURSE PRACTITIONER

## 2024-01-11 PROCEDURE — 1160F RVW MEDS BY RX/DR IN RCRD: CPT | Mod: CPTII,S$GLB,, | Performed by: NURSE PRACTITIONER

## 2024-01-11 PROCEDURE — 3075F SYST BP GE 130 - 139MM HG: CPT | Mod: CPTII,S$GLB,, | Performed by: NURSE PRACTITIONER

## 2024-01-11 PROCEDURE — 1101F PT FALLS ASSESS-DOCD LE1/YR: CPT | Mod: CPTII,S$GLB,, | Performed by: NURSE PRACTITIONER

## 2024-01-11 PROCEDURE — 3008F BODY MASS INDEX DOCD: CPT | Mod: CPTII,S$GLB,, | Performed by: NURSE PRACTITIONER

## 2024-01-11 NOTE — PROGRESS NOTES
"        Cardiology    1/11/2024  10:19 AM    Problem list  Patient Active Problem List   Diagnosis    Degeneration of lumbar intervertebral disc    Essential hypertension    History of coronary artery stent placement    Hypercholesterolemia    Vitamin D deficiency    Pain in right knee    Ischemic heart disease    Precordial pain       CC:  Results review    HPI:  No shortness of breath lately. No chest pain. Has bruising and small hematoma on left forarm.  Not painful, no trauma. Both hands have "ant like" paraesthesias and this has been ongoing.     Medications  Current Outpatient Medications   Medication Sig Dispense Refill    amLODIPine (NORVASC) 5 MG tablet Take 1 tablet (5 mg total) by mouth once daily. 30 tablet 11    aspirin 81 MG Chew Take 81 mg by mouth once daily.      atorvastatin (LIPITOR) 80 MG tablet Take 1 tablet (80 mg total) by mouth once daily. 90 tablet 3    clopidogreL (PLAVIX) 75 mg tablet Take 1 tablet (75 mg total) by mouth once daily. 90 tablet 3    isosorbide mononitrate (IMDUR) 30 MG 24 hr tablet Take 2 tablets (60 mg total) by mouth every morning. (Patient taking differently: Take 60 mg by mouth every morning. Taking 1 tab po QD, unsure if tablet is 30 mg or 60 mg will call office) 90 tablet 3    metoprolol succinate (TOPROL-XL) 25 MG 24 hr tablet Take 1 tablet (25 mg total) by mouth once daily. 90 tablet 3    ranolazine (RANEXA) 500 MG Tb12 Take 500 mg by mouth Daily.       No current facility-administered medications for this visit.      Prior to Admission medications    Medication Sig Start Date End Date Taking? Authorizing Provider   amLODIPine (NORVASC) 5 MG tablet Take 1 tablet (5 mg total) by mouth once daily. 11/27/23 11/26/24 Yes Harsha Araujo MD   aspirin 81 MG Chew Take 81 mg by mouth once daily.   Yes Provider, Historical   atorvastatin (LIPITOR) 80 MG tablet Take 1 tablet (80 mg total) by mouth once daily. 11/27/23  Yes Harsha Araujo MD   clopidogreL (PLAVIX) 75 " mg tablet Take 1 tablet (75 mg total) by mouth once daily. 23  Yes Harsha Araujo MD   isosorbide mononitrate (IMDUR) 30 MG 24 hr tablet Take 2 tablets (60 mg total) by mouth every morning.  Patient taking differently: Take 60 mg by mouth every morning. Taking 1 tab po QD, unsure if tablet is 30 mg or 60 mg will call office 23  Yes Balbina Grijalva DNP   metoprolol succinate (TOPROL-XL) 25 MG 24 hr tablet Take 1 tablet (25 mg total) by mouth once daily. 23  Yes Harsha Araujo MD   ranolazine (RANEXA) 500 MG Tb12 Take 500 mg by mouth Daily. 23  Yes Provider, Historical         History  No past medical history on file.  Past Surgical History:   Procedure Laterality Date     SECTION      x3    CORONARY STENT PLACEMENT      4 stents, Sept 2021 x1,  Oct 2021 x 3    HYSTERECTOMY       Social History     Socioeconomic History    Marital status:    Tobacco Use    Smoking status: Former     Current packs/day: 0.00     Average packs/day: 1 pack/day for 47.7 years (47.7 ttl pk-yrs)     Types: Cigarettes     Start date: 1974     Quit date: 2021     Years since quittin.3     Passive exposure: Never    Smokeless tobacco: Never    Tobacco comments:     Stopped smoking in    Substance and Sexual Activity    Alcohol use: Never    Drug use: Never    Sexual activity: Not Currently         Allergies  Review of patient's allergies indicates:   Allergen Reactions    Penicillins          Review of Systems   Review of Systems   Constitutional: Negative for diaphoresis and malaise/fatigue.   HENT: Negative.     Cardiovascular:  Negative for chest pain, claudication, dyspnea on exertion, irregular heartbeat, leg swelling, near-syncope, orthopnea, palpitations, paroxysmal nocturnal dyspnea and syncope.   Respiratory:  Negative for shortness of breath.    Endocrine: Negative for polydipsia, polyphagia and polyuria.   Hematologic/Lymphatic: Does not bruise/bleed easily.    Gastrointestinal:  Negative for bloating, nausea and vomiting.   Genitourinary: Negative.    Neurological:  Negative for excessive daytime sleepiness, dizziness, light-headedness, loss of balance and weakness.   Psychiatric/Behavioral:  The patient is not nervous/anxious.    Allergic/Immunologic: Negative.          Physical Exam  Wt Readings from Last 1 Encounters:   01/11/24 64.8 kg (142 lb 12 oz)     BP Readings from Last 3 Encounters:   01/11/24 135/69   12/11/23 126/60   11/30/23 (!) 116/55     Pulse Readings from Last 1 Encounters:   01/11/24 (!) 58     Body mass index is 26.11 kg/m².    Physical Exam  Vitals and nursing note reviewed.   Constitutional:       Appearance: Normal appearance.   HENT:      Head: Normocephalic and atraumatic.      Mouth/Throat:      Mouth: Mucous membranes are moist.   Eyes:      Pupils: Pupils are equal, round, and reactive to light.   Cardiovascular:      Rate and Rhythm: Normal rate and regular rhythm.      Pulses:           Radial pulses are 2+ on the right side and 2+ on the left side.        Dorsalis pedis pulses are 2+ on the right side and 2+ on the left side.        Posterior tibial pulses are 2+ on the right side and 2+ on the left side.      Heart sounds: No murmur heard.  Pulmonary:      Effort: Pulmonary effort is normal. No respiratory distress.      Breath sounds: Normal breath sounds.   Abdominal:      General: There is no distension.      Tenderness: There is no abdominal tenderness.   Musculoskeletal:      Cervical back: Normal range of motion.      Right lower leg: No edema.      Left lower leg: No edema.   Skin:     General: Skin is warm and dry.      Findings: Bruising and erythema present.      Comments: Hematoma left arm in lateral AC area   Neurological:      General: No focal deficit present.      Mental Status: She is alert.   Psychiatric:         Mood and Affect: Mood normal.         Behavior: Behavior normal.         Problem List Items Addressed This  Visit          Cardiac/Vascular    Essential hypertension    Overview     Stable  Continue current medications  Monitor         Current Assessment & Plan     As above         History of coronary artery stent placement    Overview     Await records, remains on DAPT.  Appears to have been complicated stenting process with two visits to the cath lab in a few weeks- continue DAPT as now.  Continue atorvastatin and metoprolol         Current Assessment & Plan     As above            Orthopedic    Contusion of left upper extremity     Other Visit Diagnoses       Hematoma    -  Primary    Relevant Orders    US Upper Extremity Veins Left (Completed)    Edema, unspecified type        Relevant Orders    US Upper Extremity Veins Left (Completed)          Left arm US negative for DVT-        @Balbina Grijalva, DNP

## 2024-01-11 NOTE — TELEPHONE ENCOUNTER
----- Message from Kalpana Ayoub sent at 1/11/2024  1:36 PM CST -----  Regarding: pt advice  Contact: pt [phone]  Pt returning call from missed call, pls call

## 2024-01-11 NOTE — TELEPHONE ENCOUNTER
Attempted to reach patient, I do not see that a call came from our office, no voicemail to leave a message.

## 2024-01-12 ENCOUNTER — TELEPHONE (OUTPATIENT)
Dept: CARDIOLOGY | Facility: CLINIC | Age: 69
End: 2024-01-12
Payer: MEDICARE

## 2024-01-12 NOTE — TELEPHONE ENCOUNTER
----- Message from Balbina Grijalva DNP sent at 1/12/2024  4:10 PM CST -----  Please contact the patient and let them know that their results were fine and do not require any change in treatment.

## 2024-01-16 PROBLEM — S40.022A CONTUSION OF LEFT UPPER EXTREMITY: Status: ACTIVE | Noted: 2024-01-16

## 2024-02-09 ENCOUNTER — PATIENT OUTREACH (OUTPATIENT)
Dept: ADMINISTRATIVE | Facility: HOSPITAL | Age: 69
End: 2024-02-09
Payer: MEDICARE

## 2024-02-09 NOTE — PROGRESS NOTES
Health Maintenance Due   Topic Date Due    TETANUS VACCINE  Never done    DEXA Scan  Never done    Shingles Vaccine (1 of 2) Never done    RSV Vaccine (Age 60+ and Pregnant patients) (1 - 1-dose 60+ series) Never done    Pneumococcal Vaccines (Age 65+) (1 of 1 - PCV) Never done    LDCT Lung Screen  10/17/2023    Mammogram  01/09/2024    Colorectal Cancer Screening  04/13/2024        Chart review done.   HM updated.   Immunizations reviewed & updated.   Care Everywhere updated.  DIS reviewed

## 2024-02-22 ENCOUNTER — TELEPHONE (OUTPATIENT)
Dept: PRIMARY CARE CLINIC | Facility: CLINIC | Age: 69
End: 2024-02-22
Payer: MEDICARE

## 2024-02-22 NOTE — TELEPHONE ENCOUNTER
----- Message from Nicole Szymanski sent at 2/22/2024 12:02 PM CST -----  Contact: [phone]  Type: Returning a call    Who left a message? Missed the call     When did the practice call? At 12:00 today     Comments:

## 2024-02-22 NOTE — TELEPHONE ENCOUNTER
Sharon was calling to confirm appointment for tomorrow at 11:00.  No voicemail set up to leave a message.

## 2024-02-23 ENCOUNTER — OFFICE VISIT (OUTPATIENT)
Dept: PRIMARY CARE CLINIC | Facility: CLINIC | Age: 69
End: 2024-02-23
Payer: MEDICARE

## 2024-02-23 VITALS
WEIGHT: 143.88 LBS | OXYGEN SATURATION: 98 % | BODY MASS INDEX: 26.48 KG/M2 | DIASTOLIC BLOOD PRESSURE: 60 MMHG | SYSTOLIC BLOOD PRESSURE: 138 MMHG | RESPIRATION RATE: 18 BRPM | HEIGHT: 62 IN | HEART RATE: 51 BPM

## 2024-02-23 DIAGNOSIS — I10 ESSENTIAL HYPERTENSION: Primary | ICD-10-CM

## 2024-02-23 DIAGNOSIS — I73.9 PERIPHERAL VASCULAR DISEASE, UNSPECIFIED: ICD-10-CM

## 2024-02-23 DIAGNOSIS — Z00.00 ANNUAL PHYSICAL EXAM: ICD-10-CM

## 2024-02-23 DIAGNOSIS — Z72.0 TOBACCO ABUSE: ICD-10-CM

## 2024-02-23 DIAGNOSIS — J43.2 CENTRILOBULAR EMPHYSEMA: ICD-10-CM

## 2024-02-23 DIAGNOSIS — Z95.5 HISTORY OF CORONARY ARTERY STENT PLACEMENT: ICD-10-CM

## 2024-02-23 DIAGNOSIS — Z87.891 PERSONAL HISTORY OF NICOTINE DEPENDENCE: ICD-10-CM

## 2024-02-23 DIAGNOSIS — Z12.31 SCREENING MAMMOGRAM, ENCOUNTER FOR: ICD-10-CM

## 2024-02-23 DIAGNOSIS — H53.8 BLURRY VISION, BILATERAL: ICD-10-CM

## 2024-02-23 DIAGNOSIS — I25.119 ATHEROSCLEROSIS OF NATIVE CORONARY ARTERY WITH ANGINA PECTORIS, UNSPECIFIED WHETHER NATIVE OR TRANSPLANTED HEART: ICD-10-CM

## 2024-02-23 PROCEDURE — 99214 OFFICE O/P EST MOD 30 MIN: CPT | Mod: S$GLB,,, | Performed by: STUDENT IN AN ORGANIZED HEALTH CARE EDUCATION/TRAINING PROGRAM

## 2024-02-23 PROCEDURE — 1101F PT FALLS ASSESS-DOCD LE1/YR: CPT | Mod: CPTII,S$GLB,, | Performed by: STUDENT IN AN ORGANIZED HEALTH CARE EDUCATION/TRAINING PROGRAM

## 2024-02-23 PROCEDURE — 99999 PR PBB SHADOW E&M-EST. PATIENT-LVL V: CPT | Mod: PBBFAC,,, | Performed by: STUDENT IN AN ORGANIZED HEALTH CARE EDUCATION/TRAINING PROGRAM

## 2024-02-23 PROCEDURE — 1159F MED LIST DOCD IN RCRD: CPT | Mod: CPTII,S$GLB,, | Performed by: STUDENT IN AN ORGANIZED HEALTH CARE EDUCATION/TRAINING PROGRAM

## 2024-02-23 PROCEDURE — 3008F BODY MASS INDEX DOCD: CPT | Mod: CPTII,S$GLB,, | Performed by: STUDENT IN AN ORGANIZED HEALTH CARE EDUCATION/TRAINING PROGRAM

## 2024-02-23 PROCEDURE — 3288F FALL RISK ASSESSMENT DOCD: CPT | Mod: CPTII,S$GLB,, | Performed by: STUDENT IN AN ORGANIZED HEALTH CARE EDUCATION/TRAINING PROGRAM

## 2024-02-23 PROCEDURE — 3075F SYST BP GE 130 - 139MM HG: CPT | Mod: CPTII,S$GLB,, | Performed by: STUDENT IN AN ORGANIZED HEALTH CARE EDUCATION/TRAINING PROGRAM

## 2024-02-23 PROCEDURE — 1160F RVW MEDS BY RX/DR IN RCRD: CPT | Mod: CPTII,S$GLB,, | Performed by: STUDENT IN AN ORGANIZED HEALTH CARE EDUCATION/TRAINING PROGRAM

## 2024-02-23 PROCEDURE — 1125F AMNT PAIN NOTED PAIN PRSNT: CPT | Mod: CPTII,S$GLB,, | Performed by: STUDENT IN AN ORGANIZED HEALTH CARE EDUCATION/TRAINING PROGRAM

## 2024-02-23 PROCEDURE — 3078F DIAST BP <80 MM HG: CPT | Mod: CPTII,S$GLB,, | Performed by: STUDENT IN AN ORGANIZED HEALTH CARE EDUCATION/TRAINING PROGRAM

## 2024-02-23 RX ORDER — RANOLAZINE 500 MG/1
500 TABLET, EXTENDED RELEASE ORAL DAILY
Qty: 90 TABLET | Refills: 3 | Status: SHIPPED | OUTPATIENT
Start: 2024-02-23

## 2024-02-23 RX ORDER — AMLODIPINE BESYLATE 5 MG/1
5 TABLET ORAL DAILY
Qty: 30 TABLET | Refills: 11 | Status: SHIPPED | OUTPATIENT
Start: 2024-02-23 | End: 2025-02-22

## 2024-02-23 RX ORDER — RANOLAZINE 500 MG/1
500 TABLET, EXTENDED RELEASE ORAL DAILY
Status: CANCELLED | OUTPATIENT
Start: 2024-02-23

## 2024-02-23 NOTE — PROGRESS NOTES
"Subjective:           Patient ID: Betty Farr   Age:  68 y.o.  Sex: female     Chief Complaint:   Follow-up      History of Present Illness:    Betty Farr is a 68 y.o. female who presents today with a chief complaint of Follow-up  .    67yo female with hx of HTN and cardiac stenting presenting for f/u appt.     Patient states she takes her medications every day, however appears to be taking half or intended dose of isosorbide mononitrate.  Was prescribed 60 mg in the morning, but only taking one 30 mg tablet.    Blood pressure was borderline in office today.    Review of Systems   Constitutional:  Negative for activity change, fatigue, fever and unexpected weight change.   HENT:  Negative for congestion, nosebleeds, sinus pressure and sneezing.    Respiratory:  Negative for cough, shortness of breath and wheezing.    Cardiovascular:  Negative for chest pain, palpitations and leg swelling.   Gastrointestinal:  Negative for abdominal distention, constipation, diarrhea and nausea.   Genitourinary:  Negative for difficulty urinating and dysuria.   Musculoskeletal:  Negative for back pain and gait problem.   Skin:  Negative for pallor and rash.   Neurological:  Negative for weakness, numbness and headaches.   Psychiatric/Behavioral:  Negative for agitation. The patient is not nervous/anxious.            Objective:        Vitals:    02/23/24 1039 02/23/24 1139 02/23/24 1145   BP: 136/72 (!) 140/64 138/60   BP Location: Left arm  Right arm   Patient Position: Sitting  Sitting   BP Method: Medium (Automatic)  Medium (Manual)   Pulse: (!) 51     Resp: 18     SpO2: 98%     Weight: 65.2 kg (143 lb 13.6 oz)     Height: 5' 2" (1.575 m)         Body mass index is 26.31 kg/m².      Physical Exam  Vitals reviewed.   Constitutional:       General: She is not in acute distress.     Appearance: Normal appearance. She is normal weight.   HENT:      Head: Normocephalic and atraumatic.      Right Ear: External ear normal.      " "Left Ear: External ear normal.      Nose: No rhinorrhea.      Mouth/Throat:      Mouth: Mucous membranes are moist.   Eyes:      Extraocular Movements: Extraocular movements intact.      Conjunctiva/sclera: Conjunctivae normal.   Cardiovascular:      Rate and Rhythm: Normal rate and regular rhythm.      Heart sounds: No murmur heard.  Pulmonary:      Effort: Pulmonary effort is normal. No respiratory distress.   Abdominal:      Tenderness: There is no right CVA tenderness or left CVA tenderness.   Musculoskeletal:      Right lower leg: No edema.      Left lower leg: No edema.   Lymphadenopathy:      Cervical: No cervical adenopathy.   Skin:     Capillary Refill: Capillary refill takes less than 2 seconds.      Coloration: Skin is not jaundiced.      Findings: No bruising.   Neurological:      General: No focal deficit present.      Mental Status: She is alert and oriented to person, place, and time.      Motor: No weakness.      Gait: Gait normal.   Psychiatric:         Mood and Affect: Mood normal.           No past medical history on file.    Lab Results   Component Value Date     11/27/2023    K 4.4 11/27/2023     11/27/2023    CO2 25 11/27/2023    BUN 15 11/27/2023    CREATININE 0.8 11/27/2023    ANIONGAP 13 11/27/2023     Lab Results   Component Value Date    HGBA1C 5.4 11/27/2023     No results found for: "BNP", "BNPTRIAGEBLO"    Lab Results   Component Value Date    WBC 6.36 11/27/2023    HGB 12.7 11/27/2023    HCT 40.2 11/27/2023     11/27/2023    GRAN 2.4 11/27/2023    GRAN 38.3 11/27/2023     Lab Results   Component Value Date    CHOL 112 (L) 11/27/2023    HDL 54 11/27/2023    LDLCALC 46.0 (L) 11/27/2023    TRIG 60 11/27/2023        Outpatient Encounter Medications as of 2/23/2024   Medication Sig Dispense Refill    aspirin 81 MG Chew Take 81 mg by mouth once daily.      atorvastatin (LIPITOR) 80 MG tablet Take 1 tablet (80 mg total) by mouth once daily. 90 tablet 3    clopidogreL " (PLAVIX) 75 mg tablet Take 1 tablet (75 mg total) by mouth once daily. 90 tablet 3    isosorbide mononitrate (IMDUR) 30 MG 24 hr tablet Take 2 tablets (60 mg total) by mouth every morning. (Patient taking differently: Take 60 mg by mouth every morning. Taking 1 tab po QD, unsure if tablet is 30 mg or 60 mg will call office) 90 tablet 3    metoprolol succinate (TOPROL-XL) 25 MG 24 hr tablet Take 1 tablet (25 mg total) by mouth once daily. 90 tablet 3    [DISCONTINUED] amLODIPine (NORVASC) 5 MG tablet Take 1 tablet (5 mg total) by mouth once daily. 30 tablet 11    [DISCONTINUED] ranolazine (RANEXA) 500 MG Tb12 Take 500 mg by mouth Daily.      amLODIPine (NORVASC) 5 MG tablet Take 1 tablet (5 mg total) by mouth once daily. 30 tablet 11    ranolazine (RANEXA) 500 MG Tb12 Take 1 tablet (500 mg total) by mouth Daily. 90 tablet 3     No facility-administered encounter medications on file as of 2/23/2024.          Assessment:       1. Essential hypertension    2. Annual physical exam    3. History of coronary artery stent placement    4. Centrilobular emphysema    5. Atherosclerosis of native coronary artery with angina pectoris, unspecified whether native or transplanted heart    6. Peripheral vascular disease, unspecified    7. Screening mammogram, encounter for    8. Blurry vision, bilateral    9. Tobacco abuse    10. Personal history of nicotine dependence           Plan:         Essential hypertension  -     amLODIPine (NORVASC) 5 MG tablet; Take 1 tablet (5 mg total) by mouth once daily.  Dispense: 30 tablet; Refill: 11  -     ranolazine (RANEXA) 500 MG Tb12; Take 1 tablet (500 mg total) by mouth Daily.  Dispense: 90 tablet; Refill: 3   - taking amlodipine 5 mg daily, was to be taking Imdur 60 mg in the morning, but was only taking 30 mg in the morning.  Will try see about increasing to full dose.   - BP boarderline today in clinic, would like to get a bit further down for consistent control.     Annual physical  exam  -     CT Chest Lung Screening Low Dose; Future; Expected date: 02/23/2024   -     History of coronary artery stent placement  -     amLODIPine (NORVASC) 5 MG tablet; Take 1 tablet (5 mg total) by mouth once daily.  Dispense: 30 tablet; Refill: 11  -     ranolazine (RANEXA) 500 MG Tb12; Take 1 tablet (500 mg total) by mouth Daily.  Dispense: 90 tablet; Refill: 3   - taking amlodipine 5 mg daily, was to be taking Imdur 60 mg in the morning, but was only taking 30 mg in the morning.  Will try see about increasing to full dose.   - last cards notes recommended continued dual antiplatelet therapy until records received from outside facility and follow-up completed.    Centrilobular emphysema   - hx of smoking and concerns of emphysematous change on last LD CT, rec repeat 1 year, order placed.     Atherosclerosis of native coronary artery with angina pectoris, unspecified whether native or transplanted heart   -     Peripheral vascular disease, unspecified   - advise regular activity/exercise, healthy diet, continued control of cholesterol.  Use of antiplatelet agents currently    Screening mammogram, encounter for  -     Mammo Digital Screening Bilat; Future; Expected date: 02/23/2024    Blurry vision, bilateral  -     Ambulatory referral/consult to Ophthalmology; Future; Expected date: 03/01/2024    Tobacco abuse  -     CT Chest Lung Screening Low Dose; Future; Expected date: 02/23/2024    Personal history of nicotine dependence  -     CT Chest Lung Screening Low Dose; Future; Expected date: 02/23/2024

## 2024-02-23 NOTE — PATIENT INSTRUCTIONS
Essential hypertension  -     amLODIPine (NORVASC) 5 MG tablet; Take 1 tablet (5 mg total) by mouth once daily.  Dispense: 30 tablet; Refill: 11  -     ranolazine (RANEXA) 500 MG Tb12; Take 1 tablet (500 mg total) by mouth Daily.  Dispense: 90 tablet; Refill: 3   - taking amlodipine 5 mg daily, was to be taking Imdur 60 mg in the morning, but was only taking 30 mg in the morning.  Will try see about increasing to full dose.   - BP boarderline today in clinic, would like to get a bit further down for consistent control.     Annual physical exam  -     CT Chest Lung Screening Low Dose; Future; Expected date: 02/23/2024   -     History of coronary artery stent placement  -     amLODIPine (NORVASC) 5 MG tablet; Take 1 tablet (5 mg total) by mouth once daily.  Dispense: 30 tablet; Refill: 11  -     ranolazine (RANEXA) 500 MG Tb12; Take 1 tablet (500 mg total) by mouth Daily.  Dispense: 90 tablet; Refill: 3   - taking amlodipine 5 mg daily, was to be taking Imdur 60 mg in the morning, but was only taking 30 mg in the morning.  Will try see about increasing to full dose.    Centrilobular emphysema   - hx of smoking and concerns of emphysematous change on last LD CT, rec repeat 1 year, order placed.     Atherosclerosis of native coronary artery with angina pectoris, unspecified whether native or transplanted heart   -     Peripheral vascular disease, unspecified   -     Screening mammogram, encounter for  -     Mammo Digital Screening Bilat; Future; Expected date: 02/23/2024    Blurry vision, bilateral  -     Ambulatory referral/consult to Ophthalmology; Future; Expected date: 03/01/2024    Tobacco abuse  -     CT Chest Lung Screening Low Dose; Future; Expected date: 02/23/2024    Personal history of nicotine dependence  -     CT Chest Lung Screening Low Dose; Future; Expected date: 02/23/2024

## 2024-03-01 ENCOUNTER — TELEPHONE (OUTPATIENT)
Dept: CARDIOLOGY | Facility: CLINIC | Age: 69
End: 2024-03-01
Payer: MEDICARE

## 2024-03-01 NOTE — TELEPHONE ENCOUNTER
I spoke with patient, refill request received for Isosobide, ANJANA Grijalva is no longer working in cardiology.   Patient states she has enough medication to last until after seeing Dr. Mendoza in cardiology clinic on 4/19/24, will receive refills at [facility].

## 2024-03-07 ENCOUNTER — HOSPITAL ENCOUNTER (OUTPATIENT)
Dept: RADIOLOGY | Facility: HOSPITAL | Age: 69
Discharge: HOME | End: 2024-03-07
Attending: STUDENT IN AN ORGANIZED HEALTH CARE EDUCATION/TRAINING PROGRAM
Payer: MEDICARE

## 2024-03-07 DIAGNOSIS — Z12.31 SCREENING MAMMOGRAM, ENCOUNTER FOR: ICD-10-CM

## 2024-03-07 PROCEDURE — 77067 SCR MAMMO BI INCL CAD: CPT | Mod: TC

## 2024-03-07 PROCEDURE — 77063 BREAST TOMOSYNTHESIS BI: CPT | Mod: 26,,, | Performed by: RADIOLOGY

## 2024-03-07 PROCEDURE — 77067 SCR MAMMO BI INCL CAD: CPT | Mod: 26,,, | Performed by: RADIOLOGY

## 2024-04-16 ENCOUNTER — TELEPHONE (OUTPATIENT)
Dept: PRIMARY CARE CLINIC | Facility: CLINIC | Age: 69
End: 2024-04-16
Payer: MEDICARE

## 2024-04-16 NOTE — TELEPHONE ENCOUNTER
----- Message from Madelin Shelley sent at 4/16/2024 12:09 PM CDT -----  Contact: [phone]  Per pt, her metoprolol succinate (TOPROL-XL) 25 MG 24 hr tablet 90 tablet is needing a prior authorization.     Pt is using   [id number] [address]  [address]  [facility] [address]  Phone: [phone] Fax: [phone]            Thank you

## 2024-04-19 ENCOUNTER — OFFICE VISIT (OUTPATIENT)
Dept: CARDIOLOGY | Facility: CLINIC | Age: 69
End: 2024-04-19
Payer: MEDICARE

## 2024-04-19 VITALS
BODY MASS INDEX: 25.97 KG/M2 | HEIGHT: 62 IN | HEART RATE: 60 BPM | WEIGHT: 141.13 LBS | SYSTOLIC BLOOD PRESSURE: 121 MMHG | OXYGEN SATURATION: 97 % | DIASTOLIC BLOOD PRESSURE: 59 MMHG

## 2024-04-19 DIAGNOSIS — Z95.5 HISTORY OF CORONARY ARTERY STENT PLACEMENT: ICD-10-CM

## 2024-04-19 DIAGNOSIS — I25.119 ATHEROSCLEROSIS OF NATIVE CORONARY ARTERY WITH ANGINA PECTORIS, UNSPECIFIED WHETHER NATIVE OR TRANSPLANTED HEART: Primary | ICD-10-CM

## 2024-04-19 DIAGNOSIS — I73.9 PERIPHERAL VASCULAR DISEASE, UNSPECIFIED: ICD-10-CM

## 2024-04-19 DIAGNOSIS — I10 ESSENTIAL HYPERTENSION: ICD-10-CM

## 2024-04-19 DIAGNOSIS — E78.00 HYPERCHOLESTEROLEMIA: ICD-10-CM

## 2024-04-19 PROCEDURE — 3288F FALL RISK ASSESSMENT DOCD: CPT | Mod: CPTII,S$GLB,, | Performed by: INTERNAL MEDICINE

## 2024-04-19 PROCEDURE — 1160F RVW MEDS BY RX/DR IN RCRD: CPT | Mod: CPTII,S$GLB,, | Performed by: INTERNAL MEDICINE

## 2024-04-19 PROCEDURE — 1101F PT FALLS ASSESS-DOCD LE1/YR: CPT | Mod: CPTII,S$GLB,, | Performed by: INTERNAL MEDICINE

## 2024-04-19 PROCEDURE — 99999 PR PBB SHADOW E&M-EST. PATIENT-LVL III: CPT | Mod: PBBFAC,,, | Performed by: INTERNAL MEDICINE

## 2024-04-19 PROCEDURE — 3074F SYST BP LT 130 MM HG: CPT | Mod: CPTII,S$GLB,, | Performed by: INTERNAL MEDICINE

## 2024-04-19 PROCEDURE — 3008F BODY MASS INDEX DOCD: CPT | Mod: CPTII,S$GLB,, | Performed by: INTERNAL MEDICINE

## 2024-04-19 PROCEDURE — 3078F DIAST BP <80 MM HG: CPT | Mod: CPTII,S$GLB,, | Performed by: INTERNAL MEDICINE

## 2024-04-19 PROCEDURE — 99204 OFFICE O/P NEW MOD 45 MIN: CPT | Mod: S$GLB,,, | Performed by: INTERNAL MEDICINE

## 2024-04-19 PROCEDURE — 1125F AMNT PAIN NOTED PAIN PRSNT: CPT | Mod: CPTII,S$GLB,, | Performed by: INTERNAL MEDICINE

## 2024-04-19 PROCEDURE — 1159F MED LIST DOCD IN RCRD: CPT | Mod: CPTII,S$GLB,, | Performed by: INTERNAL MEDICINE

## 2024-04-19 NOTE — PROGRESS NOTES
[facility] - Cardiology [id number]  Cardiology Clinic Note      Chief Complaint  Chief Complaint   Patient presents with    Follow-up       HPI:    68-year-old female with tobacco use, COPD, hyperlipidemia, hypertension, peripheral vascular disease, Holter  no significant events, coronary artery disease status post PCI  (4 stents over 2 days at [facility] in [place] - 1 of the cards says that a stent is in the mid LAD but the others do not give a location) subsequent MPI  no defect normal estimated EF    No cardiovascular symptoms    Medications  Current Outpatient Medications   Medication Sig Dispense Refill    amLODIPine (NORVASC) 5 MG tablet Take 1 tablet (5 mg total) by mouth once daily. 30 tablet 11    aspirin 81 MG Chew Take 81 mg by mouth once daily.      atorvastatin (LIPITOR) 80 MG tablet Take 1 tablet (80 mg total) by mouth once daily. 90 tablet 3    clopidogreL (PLAVIX) 75 mg tablet Take 1 tablet (75 mg total) by mouth once daily. 90 tablet 3    isosorbide mononitrate (IMDUR) 30 MG 24 hr tablet Take 2 tablets (60 mg total) by mouth every morning. (Patient taking differently: Take 60 mg by mouth every morning. Taking 1 tab po QD, unsure if tablet is 30 mg or 60 mg will call office) 90 tablet 3    metoprolol succinate (TOPROL-XL) 25 MG 24 hr tablet Take 1 tablet (25 mg total) by mouth once daily. 90 tablet 3    ranolazine (RANEXA) 500 MG Tb12 Take 1 tablet (500 mg total) by mouth Daily. 90 tablet 3     No current facility-administered medications for this visit.        History  No past medical history on file.  Past Surgical History:   Procedure Laterality Date     SECTION      x3    CORONARY STENT PLACEMENT      4 stents, Sept 2021 x1,  Oct 2021 x 3    HYSTERECTOMY       Social History     Socioeconomic History    Marital status:    Tobacco Use    Smoking status: Former     Current packs/day: 0.00     Average packs/day: 1 pack/day for 47.7 years (47.7 ttl pk-yrs)     Types:  Cigarettes     Start date: 1974     Quit date: 2021     Years since quittin.6     Passive exposure: Never    Smokeless tobacco: Never    Tobacco comments:     Stopped smoking in    Substance and Sexual Activity    Alcohol use: Yes    Drug use: Never    Sexual activity: Not Currently     Social Determinants of Health     Financial Resource Strain: Medium Risk (2024)    Overall Financial Resource Strain (CARDIA)     Difficulty of Paying Living Expenses: Somewhat hard   Food Insecurity: Food Insecurity Present (2024)    Hunger Vital Sign     Worried About Running Out of Food in the Last Year: Sometimes true     Ran Out of Food in the Last Year: Sometimes true   Transportation Needs: No Transportation Needs (2024)    PRAPARE - Transportation     Lack of Transportation (Medical): No     Lack of Transportation (Non-Medical): No   Physical Activity: Insufficiently Active (2024)    Exercise Vital Sign     Days of Exercise per Week: 1 day     Minutes of Exercise per Session: 10 min   Stress: Stress Concern Present (2024)    [place] [employer] - Occupational Stress Questionnaire     Feeling of Stress : To some extent   Social Connections: Unknown (2024)    Social Connection and Isolation Panel [NHANES]     Frequency of Communication with Friends and Family: More than three times a week     Frequency of Social Gatherings with Friends and Family: Once a week     Active Member of Clubs or Organizations: No     Attends Club or Organization Meetings: Never     Marital Status:    Housing Stability: Unknown (2024)    Housing Stability Vital Sign     Unable to Pay for Housing in the Last Year: No     Family History   Problem Relation Name Age of Onset    No Known Problems Mother      No Known Problems Father      Hypertension Maternal Grandmother      Diabetes Maternal Grandmother      Hypertension Son      Stroke Neg Hx      Breast cancer Neg Hx       Colon cancer Neg Hx      Lung cancer Neg Hx          Allergies  Review of patient's allergies indicates:   Allergen Reactions    Penicillins        Review of Systems   Review of Systems   Constitutional: Negative for fever.   HENT:  Negative for nosebleeds.    Eyes:  Negative for visual disturbance.   Cardiovascular:  Negative for chest pain, claudication, dyspnea on exertion, palpitations and syncope.   Respiratory:  Negative for cough, hemoptysis and wheezing.    Endocrine: Negative for cold intolerance, heat intolerance, polyphagia and polyuria.   Hematologic/Lymphatic: Negative for bleeding problem.   Skin:  Negative for rash.   Musculoskeletal:  Negative for myalgias.   Gastrointestinal:  Negative for hematemesis, hematochezia, nausea and vomiting.   Genitourinary:  Negative for dysuria.   Neurological:  Negative for focal weakness and sensory change.   Psychiatric/Behavioral:  Negative for altered mental status.        Physical Exam  Vitals:    04/19/24 1434   BP: (!) 121/59   Pulse:      Wt Readings from Last 1 Encounters:   04/19/24 64 kg (141 lb 1.5 oz)     Physical Exam  Constitutional:       General: She is not in acute distress.  HENT:      Head: Normocephalic and atraumatic.      Mouth/Throat:      Mouth: Mucous membranes are moist.   Eyes:      Extraocular Movements: Extraocular movements intact.      Pupils: Pupils are equal, round, and reactive to light.   Neck:      Vascular: No carotid bruit or JVD.   Cardiovascular:      Rate and Rhythm: Normal rate and regular rhythm.      Heart sounds: No murmur heard.     No friction rub. No gallop.   Pulmonary:      Effort: Pulmonary effort is normal.      Breath sounds: Normal breath sounds.   Abdominal:      Tenderness: There is no abdominal tenderness. There is no guarding or rebound.   Musculoskeletal:      Right lower leg: No edema.      Left lower leg: No edema.   Skin:     General: Skin is warm and dry.      Capillary Refill: Capillary refill takes less  than 2 seconds.   Neurological:      General: No focal deficit present.      Mental Status: She is alert.   Psychiatric:         Mood and Affect: Mood normal.         Labs  Hospital Outpatient Visit on 12/21/2023   Component Date Value Ref Range Status    Event Monitor Day 12/21/2023 2   Final    holter length minutes 12/21/2023 59   Final    Holter length hours 12/21/2023 47   Final    holter length dec hours 12/21/2023 95.98   Final    Sinus min HR 12/21/2023 43   Final    Sinus max hr 12/21/2023 117   Final    Sinus avg hr 12/21/2023 59   Final   Hospital Outpatient Visit on 12/21/2023   Component Date Value Ref Range Status    85% Max Predicted HR 12/21/2023 131   Final    Max Predicted HR 12/21/2023 154   Final    [facility] CV CPX PATIENT IS MALE 12/21/2023 0.0   Final    [facility] CV CPX PATIENT IS FEMALE 12/21/2023 1.0   Final    HR at rest 12/21/2023 56  bpm Final    Systolic blood pressure 12/21/2023 148  mmHg Final    Diastolic blood pressure 12/21/2023 67  mmHg Final    RPP 12/21/2023 8,288   Final    Exercise duration (min) 12/21/2023 6  minutes Final    Exercise duration (sec) 12/21/2023 0  seconds Final    Peak HR 12/21/2023 129  bpm Final    Peak Systolic BP 12/21/2023 192  mmHg Final    Peak Diatolic BP 12/21/2023 58  mmHg Final    Peak RPP 12/21/2023 24,768   Final    Estimated METs 12/21/2023 7   Final    % Max HR Achieved 12/21/2023 87   Final    1 Minute Recovery HR 12/21/2023 107  bpm Final   Lab Visit on 11/27/2023   Component Date Value Ref Range Status    WBC 11/27/2023 6.36  3.90 - 12.70 K/uL Final    RBC 11/27/2023 4.54  4.00 - 5.40 M/uL Final    Hemoglobin 11/27/2023 12.7  12.0 - 16.0 g/dL Final    Hematocrit 11/27/2023 40.2  37.0 - 48.5 % Final    MCV 11/27/2023 89  82 - 98 fL Final    MCH 11/27/2023 28.0  27.0 - 31.0 pg Final    MCHC 11/27/2023 31.6 (L)  32.0 - 36.0 g/dL Final    RDW 11/27/2023 13.5  11.5 - 14.5 % Final    Platelets 11/27/2023 267  150 - 450 K/uL Final    MPV 11/27/2023 11.2  9.2 -  12.9 fL Final    Immature Granulocytes 11/27/2023 0.5  0.0 - 0.5 % Final    Gran # (ANC) 11/27/2023 2.4  1.8 - 7.7 K/uL Final    Immature Grans (Abs) 11/27/2023 0.03  0.00 - 0.04 K/uL Final    Comment: Mild elevation in immature granulocytes is non specific and   can be seen in a variety of conditions including stress response,   acute inflammation, trauma and pregnancy. Correlation with other   laboratory and clinical findings is essential.      Lymph # 11/27/2023 3.1  1.0 - 4.8 K/uL Final    Mono # 11/27/2023 0.5  0.3 - 1.0 K/uL Final    Eos # 11/27/2023 0.2  0.0 - 0.5 K/uL Final    Baso # 11/27/2023 0.03  0.00 - 0.20 K/uL Final    nRBC 11/27/2023 0  0 /100 WBC Final    Gran % 11/27/2023 38.3  38.0 - 73.0 % Final    Lymph % 11/27/2023 49.2 (H)  18.0 - 48.0 % Final    Mono % 11/27/2023 8.0  4.0 - 15.0 % Final    Eosinophil % 11/27/2023 3.5  0.0 - 8.0 % Final    Basophil % 11/27/2023 0.5  0.0 - 1.9 % Final    Differential Method 11/27/2023 Automated   Final    Sodium 11/27/2023 144  136 - 145 mmol/L Final    Potassium 11/27/2023 4.4  3.5 - 5.1 mmol/L Final    Chloride 11/27/2023 106  95 - 110 mmol/L Final    CO2 11/27/2023 25  23 - 29 mmol/L Final    Glucose 11/27/2023 94  70 - 110 mg/dL Final    BUN 11/27/2023 15  8 - 23 mg/dL Final    Creatinine 11/27/2023 0.8  0.5 - 1.4 mg/dL Final    Calcium 11/27/2023 9.8  8.7 - 10.5 mg/dL Final    Total Protein 11/27/2023 7.1  6.0 - 8.4 g/dL Final    Albumin 11/27/2023 4.1  3.5 - 5.2 g/dL Final    Total Bilirubin 11/27/2023 0.9  0.1 - 1.0 mg/dL Final    Comment: For infants and newborns, interpretation of results should be based  on gestational age, weight and in agreement with clinical  observations.    Premature Infant recommended reference ranges:  Up to 24 hours.............<8.0 mg/dL  Up to 48 hours............<12.0 mg/dL  3-5 days..................<15.0 mg/dL  6-29 days.................<15.0 mg/dL      Alkaline Phosphatase 11/27/2023 85  55 - 135 U/L Final    AST  11/27/2023 15  10 - 40 U/L Final    ALT 11/27/2023 19  10 - 44 U/L Final    eGFR 11/27/2023 >60.0  >60 mL/min/1.73 m^2 Final    Anion Gap 11/27/2023 13  8 - 16 mmol/L Final    Cholesterol 11/27/2023 112 (L)  120 - 199 mg/dL Final    Comment: The National Cholesterol Education Program (NCEP) has set the  following guidelines (reference ranges) for Cholesterol:  Optimal.....................<200 mg/dL  Borderline High.............200-239 mg/dL  High........................> or = 240 mg/dL      Triglycerides 11/27/2023 60  30 - 150 mg/dL Final    Comment: The National Cholesterol Education Program (NCEP) has set the  following guidelines (reference values) for triglycerides:  Normal......................<150 mg/dL  Borderline High.............150-199 mg/dL  High........................200-499 mg/dL      HDL 11/27/2023 54  40 - 75 mg/dL Final    Comment: The National Cholesterol Education Program (NCEP) has set the  following guidelines (reference values) for HDL Cholesterol:  Low...............<40 mg/dL  Optimal...........>60 mg/dL      LDL Cholesterol 11/27/2023 46.0 (L)  63.0 - 159.0 mg/dL Final    Comment: The National Cholesterol Education Program (NCEP) has set the  following guidelines (reference values) for LDL Cholesterol:  Optimal.......................<130 mg/dL  Borderline High...............130-159 mg/dL  High..........................160-189 mg/dL  Very High.....................>190 mg/dL      HDL/Cholesterol Ratio 11/27/2023 48.2  20.0 - 50.0 % Final    Total Cholesterol/HDL Ratio 11/27/2023 2.1  2.0 - 5.0 Final    Non-HDL Cholesterol 11/27/2023 58  mg/dL Final    Comment: Risk category and Non-HDL cholesterol goals:  Coronary heart disease (CHD)or equivalent (10-year risk of CHD >20%):  Non-HDL cholesterol goal     <130 mg/dL  Two or more CHD risk factors and 10-year risk of CHD <= 20%:  Non-HDL cholesterol goal     <160 mg/dL  0 to 1 CHD risk factor:  Non-HDL cholesterol goal     <190 mg/dL       Hemoglobin A1C 11/27/2023 5.4  4.0 - 5.6 % Final    Comment: ADA Screening Guidelines:  5.7-6.4%  Consistent with prediabetes  >or=6.5%  Consistent with diabetes    High levels of fetal hemoglobin interfere with the HbA1C  assay. Heterozygous hemoglobin variants (HbS, HgC, etc)do  not significantly interfere with this assay.   However, presence of multiple variants may affect accuracy.      Estimated Avg Glucose 11/27/2023 108  68 - 131 mg/dL Final    TSH 11/27/2023 0.558  0.400 - 4.000 uIU/mL Final    Free T4 11/27/2023 1.10  0.71 - 1.51 ng/dL Final    Hepatitis C Ab 11/27/2023 Non-reactive  Non-reactive Final    HIV 1/2 Ag/Ab 11/27/2023 Non-reactive  Non-reactive Final       EKG  As above    Echo   No results found for this or any previous visit.    Imaging  No results found.    Prior coronary angiogram / intervention:  No records    Assessment and Plan  1. Atherosclerosis of native coronary artery with angina pectoris, unspecified whether native or transplanted heart  Continue dual antiplatelet therapy with high-dose statin Imdur metoprolol Ranexa  Records    2. Essential hypertension  Controlled on Imdur metoprolol amlodipine    3. History of coronary artery stent placement  As per 1.    4. Hypercholesterolemia  Statin    5. Peripheral vascular disease, unspecified  No records continue treatment as coronary artery disease        Follow Up  Follow up in about 3 months (around 7/19/2024).  Request records    Tre Mendoza MD, [facility], [facility]  Interventional Cardiology     Total professional time spent for the encounter: 45 minutes  Time was spent preparing to see the patient, reviewing results of prior testing, obtaining and/or reviewing separately obtained history, performing a medically appropriate examination and interview, counseling and educating the patient/family, ordering medications/tests/procedures, referring and communicating with other health care professionals, documenting clinical information in the  electronic health record, and independently interpreting results.

## 2024-07-19 ENCOUNTER — OFFICE VISIT (OUTPATIENT)
Dept: CARDIOLOGY | Facility: CLINIC | Age: 69
End: 2024-07-19
Payer: MEDICARE

## 2024-07-19 VITALS
DIASTOLIC BLOOD PRESSURE: 69 MMHG | SYSTOLIC BLOOD PRESSURE: 133 MMHG | WEIGHT: 143.88 LBS | BODY MASS INDEX: 26.48 KG/M2 | OXYGEN SATURATION: 97 % | HEIGHT: 62 IN | HEART RATE: 56 BPM

## 2024-07-19 DIAGNOSIS — E78.00 HYPERCHOLESTEROLEMIA: ICD-10-CM

## 2024-07-19 DIAGNOSIS — I73.9 PERIPHERAL VASCULAR DISEASE, UNSPECIFIED: Primary | ICD-10-CM

## 2024-07-19 DIAGNOSIS — R42 LIGHTHEADEDNESS: ICD-10-CM

## 2024-07-19 DIAGNOSIS — I25.119 ATHEROSCLEROSIS OF NATIVE CORONARY ARTERY WITH ANGINA PECTORIS, UNSPECIFIED WHETHER NATIVE OR TRANSPLANTED HEART: ICD-10-CM

## 2024-07-19 DIAGNOSIS — I10 ESSENTIAL HYPERTENSION: ICD-10-CM

## 2024-07-19 DIAGNOSIS — Z95.5 HISTORY OF CORONARY ARTERY STENT PLACEMENT: ICD-10-CM

## 2024-07-19 PROCEDURE — 1159F MED LIST DOCD IN RCRD: CPT | Mod: CPTII,,, | Performed by: INTERNAL MEDICINE

## 2024-07-19 PROCEDURE — 3288F FALL RISK ASSESSMENT DOCD: CPT | Mod: CPTII,,, | Performed by: INTERNAL MEDICINE

## 2024-07-19 PROCEDURE — 99213 OFFICE O/P EST LOW 20 MIN: CPT | Mod: PBBFAC,PN | Performed by: INTERNAL MEDICINE

## 2024-07-19 PROCEDURE — 99214 OFFICE O/P EST MOD 30 MIN: CPT | Mod: S$PBB,,, | Performed by: INTERNAL MEDICINE

## 2024-07-19 PROCEDURE — 1160F RVW MEDS BY RX/DR IN RCRD: CPT | Mod: CPTII,,, | Performed by: INTERNAL MEDICINE

## 2024-07-19 PROCEDURE — 99999 PR PBB SHADOW E&M-EST. PATIENT-LVL III: CPT | Mod: PBBFAC,,, | Performed by: INTERNAL MEDICINE

## 2024-07-19 PROCEDURE — 3078F DIAST BP <80 MM HG: CPT | Mod: CPTII,,, | Performed by: INTERNAL MEDICINE

## 2024-07-19 PROCEDURE — 3075F SYST BP GE 130 - 139MM HG: CPT | Mod: CPTII,,, | Performed by: INTERNAL MEDICINE

## 2024-07-19 PROCEDURE — 1125F AMNT PAIN NOTED PAIN PRSNT: CPT | Mod: CPTII,,, | Performed by: INTERNAL MEDICINE

## 2024-07-19 PROCEDURE — 3008F BODY MASS INDEX DOCD: CPT | Mod: CPTII,,, | Performed by: INTERNAL MEDICINE

## 2024-07-19 PROCEDURE — 1101F PT FALLS ASSESS-DOCD LE1/YR: CPT | Mod: CPTII,,, | Performed by: INTERNAL MEDICINE

## 2024-07-19 RX ORDER — ISOSORBIDE MONONITRATE 60 MG/1
60 TABLET, EXTENDED RELEASE ORAL DAILY
Qty: 90 TABLET | Refills: 1 | Status: SHIPPED | OUTPATIENT
Start: 2024-07-19 | End: 2025-07-19

## 2024-07-19 NOTE — PROGRESS NOTES
[facility] - [facility] [address]  Cardiology Clinic Note      Chief Complaint  Chief Complaint   Patient presents with    Follow-up       HPI:    68-year-old female with tobacco use, COPD, hyperlipidemia, hypertension, peripheral vascular disease, Holter  no significant events, coronary artery disease status post PCI  (4 stents over 2 days at [facility] in [place] - 1 of the cards says that a stent is in the mid LAD but the others do not give a location) subsequent MPI  no defect normal estimated EF    The patient states that she has been having lightheadedness for the past month most often when arising from a supine position in the morning  She also has been taking Imdur 30 though she was previously taking 60 and would like to go back to 60  She notes tingling in her fingers occasionally and headaches suggested Neurology    Medications  Current Outpatient Medications   Medication Sig Dispense Refill    amLODIPine (NORVASC) 5 MG tablet Take 1 tablet (5 mg total) by mouth once daily. 30 tablet 11    aspirin 81 MG Chew Take 81 mg by mouth once daily.      atorvastatin (LIPITOR) 80 MG tablet Take 1 tablet (80 mg total) by mouth once daily. 90 tablet 3    clopidogreL (PLAVIX) 75 mg tablet Take 1 tablet (75 mg total) by mouth once daily. 90 tablet 3    isosorbide mononitrate (IMDUR) 30 MG 24 hr tablet Take 2 tablets (60 mg total) by mouth every morning. (Patient taking differently: Take 60 mg by mouth every morning. Taking 1 tab po QD, Pt states pharmacy filled Rx as 30 mg QD) 90 tablet 3    metoprolol succinate (TOPROL-XL) 25 MG 24 hr tablet Take 1 tablet (25 mg total) by mouth once daily. 90 tablet 3    ranolazine (RANEXA) 500 MG Tb12 Take 1 tablet (500 mg total) by mouth Daily. 90 tablet 3     No current facility-administered medications for this visit.        History  No past medical history on file.  Past Surgical History:   Procedure Laterality Date     SECTION      x3    CORONARY STENT PLACEMENT       4 stents, Sept 2021 x1,  Oct 2021 x 3    HYSTERECTOMY       Social History     Socioeconomic History    Marital status:    Tobacco Use    Smoking status: Former     Current packs/day: 0.00     Average packs/day: 1 pack/day for 47.7 years (47.7 ttl pk-yrs)     Types: Cigarettes     Start date: 1974     Quit date: 2021     Years since quittin.8     Passive exposure: Never    Smokeless tobacco: Never    Tobacco comments:     Stopped smoking in    Substance and Sexual Activity    Alcohol use: Yes    Drug use: Never    Sexual activity: Not Currently     Social Determinants of Health     Financial Resource Strain: Medium Risk (2024)    Overall Financial Resource Strain (CARDIA)     Difficulty of Paying Living Expenses: Somewhat hard   Food Insecurity: Food Insecurity Present (2024)    Hunger Vital Sign     Worried About Running Out of Food in the Last Year: Sometimes true     Ran Out of Food in the Last Year: Sometimes true   Transportation Needs: No Transportation Needs (2024)    PRAPARE - Transportation     Lack of Transportation (Medical): No     Lack of Transportation (Non-Medical): No   Physical Activity: Insufficiently Active (2024)    Exercise Vital Sign     Days of Exercise per Week: 1 day     Minutes of Exercise per Session: 10 min   Stress: Stress Concern Present (2024)    [employer] [place] [employer] - Occupational Stress Questionnaire     Feeling of Stress : To some extent   Housing Stability: Unknown (2024)    Housing Stability Vital Sign     Unable to Pay for Housing in the Last Year: No     Family History   Problem Relation Name Age of Onset    No Known Problems Mother      No Known Problems Father      Hypertension Maternal Grandmother      Diabetes Maternal Grandmother      Hypertension Son      Stroke Neg Hx      Breast cancer Neg Hx      Colon cancer Neg Hx      Lung cancer Neg Hx          Allergies  Review of patient's allergies  indicates:   Allergen Reactions    Penicillins        Review of Systems   Review of Systems   Constitutional: Negative for fever.   HENT:  Negative for nosebleeds.    Eyes:  Negative for visual disturbance.   Cardiovascular:  Negative for chest pain, claudication, dyspnea on exertion, palpitations and syncope.   Respiratory:  Negative for cough, hemoptysis and wheezing.    Endocrine: Negative for cold intolerance, heat intolerance, polyphagia and polyuria.   Hematologic/Lymphatic: Negative for bleeding problem.   Skin:  Negative for rash.   Musculoskeletal:  Negative for myalgias.   Gastrointestinal:  Negative for hematemesis, hematochezia, nausea and vomiting.   Genitourinary:  Negative for dysuria.   Neurological:  Negative for focal weakness and sensory change.   Psychiatric/Behavioral:  Negative for altered mental status.        Physical Exam  Vitals:    07/19/24 1446   BP: 133/69   Pulse: (!) 56     Wt Readings from Last 1 Encounters:   07/19/24 65.2 kg (143 lb 13.6 oz)     Physical Exam  Constitutional:       General: She is not in acute distress.  HENT:      Head: Normocephalic and atraumatic.      Mouth/Throat:      Mouth: Mucous membranes are moist.   Eyes:      Extraocular Movements: Extraocular movements intact.      Pupils: Pupils are equal, round, and reactive to light.   Neck:      Vascular: No carotid bruit or JVD.   Cardiovascular:      Rate and Rhythm: Normal rate and regular rhythm.      Heart sounds: No murmur heard.     No friction rub. No gallop.   Pulmonary:      Effort: Pulmonary effort is normal.      Breath sounds: Normal breath sounds.   Abdominal:      Tenderness: There is no abdominal tenderness. There is no guarding or rebound.   Musculoskeletal:      Right lower leg: No edema.      Left lower leg: No edema.   Skin:     General: Skin is warm and dry.      Capillary Refill: Capillary refill takes less than 2 seconds.   Neurological:      General: No focal deficit present.      Mental  Status: She is alert.   Psychiatric:         Mood and Affect: Mood normal.         Labs  No visits with results within 6 Month(s) from this visit.   Latest known visit with results is:   Hospital Outpatient Visit on 12/21/2023   Component Date Value Ref Range Status    Event Monitor Day 12/21/2023 2   Final    holter length minutes 12/21/2023 59   Final    Holter length hours 12/21/2023 47   Final    holter length dec hours 12/21/2023 95.98   Final    Sinus min HR 12/21/2023 43   Final    Sinus max hr 12/21/2023 117   Final    Sinus avg hr 12/21/2023 59   Final       EKG  As above    Echo   No results found for this or any previous visit.    Imaging  No results found.    Prior coronary angiogram / intervention:  No records    Assessment and Plan  1. Atherosclerosis of native coronary artery with angina pectoris, unspecified whether native or transplanted heart  Continue dual antiplatelet therapy with high-dose statin Imdur metoprolol Ranexa  Records    2. Essential hypertension  Controlled on Imdur metoprolol amlodipine    3. History of coronary artery stent placement  As per 1.    4. Hypercholesterolemia  Statin    5. Peripheral vascular disease, unspecified  No records continue treatment as coronary artery disease    6. Lightheadedness  Check Holter  Advised to sit on the side of the bed for a bit before standing in the morning  Check blood pressure and heart rate during these events      Follow Up  3 months  Request records; still have not received    Tre Mendoza MD, FACC, [facility]  Interventional Cardiology     Total professional time spent for the encounter: 30 minutes  Time was spent preparing to see the patient, reviewing results of prior testing, obtaining and/or reviewing separately obtained history, performing a medically appropriate examination and interview, counseling and educating the patient/family, ordering medications/tests/procedures, referring and communicating with other health care professionals,  documenting clinical information in the electronic health record, and independently interpreting results.

## 2024-08-02 ENCOUNTER — TELEPHONE (OUTPATIENT)
Dept: CARDIOLOGY | Facility: CLINIC | Age: 69
End: 2024-08-02
Payer: MEDICARE

## 2024-08-02 NOTE — TELEPHONE ENCOUNTER
----- Message from Tre Mendoza MD sent at 8/1/2024  7:20 PM CDT -----  Please let her know holter looks fine  ----- Message -----  From: Tre Mendoza MD  Sent: 8/1/2024  11:31 AM CDT  To: Tre Mendoza MD

## 2024-08-09 ENCOUNTER — PATIENT OUTREACH (OUTPATIENT)
Dept: ADMINISTRATIVE | Facility: HOSPITAL | Age: 69
End: 2024-08-09
Payer: MEDICARE

## 2024-08-23 ENCOUNTER — OFFICE VISIT (OUTPATIENT)
Dept: PRIMARY CARE CLINIC | Facility: CLINIC | Age: 69
End: 2024-08-23
Payer: MEDICARE

## 2024-08-23 VITALS
BODY MASS INDEX: 27.55 KG/M2 | OXYGEN SATURATION: 97 % | WEIGHT: 149.69 LBS | DIASTOLIC BLOOD PRESSURE: 72 MMHG | SYSTOLIC BLOOD PRESSURE: 134 MMHG | HEART RATE: 69 BPM | HEIGHT: 62 IN | RESPIRATION RATE: 19 BRPM

## 2024-08-23 DIAGNOSIS — Z12.11 COLON CANCER SCREENING: ICD-10-CM

## 2024-08-23 DIAGNOSIS — E78.00 HYPERCHOLESTEROLEMIA: ICD-10-CM

## 2024-08-23 DIAGNOSIS — Z00.00 ANNUAL PHYSICAL EXAM: Primary | ICD-10-CM

## 2024-08-23 DIAGNOSIS — Z23 NEED FOR VACCINATION: ICD-10-CM

## 2024-08-23 DIAGNOSIS — Z95.5 HISTORY OF CORONARY ARTERY STENT PLACEMENT: ICD-10-CM

## 2024-08-23 DIAGNOSIS — I10 ESSENTIAL HYPERTENSION: ICD-10-CM

## 2024-08-23 DIAGNOSIS — Z79.899 OTHER LONG TERM (CURRENT) DRUG THERAPY: ICD-10-CM

## 2024-08-23 PROCEDURE — 99999 PR PBB SHADOW E&M-EST. PATIENT-LVL IV: CPT | Mod: PBBFAC,,, | Performed by: STUDENT IN AN ORGANIZED HEALTH CARE EDUCATION/TRAINING PROGRAM

## 2024-08-23 RX ORDER — CLOPIDOGREL BISULFATE 75 MG/1
75 TABLET ORAL DAILY
Qty: 90 TABLET | Refills: 3 | Status: SHIPPED | OUTPATIENT
Start: 2024-08-23

## 2024-08-23 RX ORDER — AMLODIPINE BESYLATE 10 MG/1
10 TABLET ORAL DAILY
Qty: 90 TABLET | Refills: 3 | Status: SHIPPED | OUTPATIENT
Start: 2024-08-23 | End: 2025-08-23

## 2024-08-23 RX ORDER — ATORVASTATIN CALCIUM 80 MG/1
80 TABLET, FILM COATED ORAL DAILY
Qty: 90 TABLET | Refills: 3 | Status: SHIPPED | OUTPATIENT
Start: 2024-08-23

## 2024-08-23 RX ORDER — METOPROLOL SUCCINATE 25 MG/1
25 TABLET, EXTENDED RELEASE ORAL DAILY
Qty: 90 TABLET | Refills: 3 | Status: SHIPPED | OUTPATIENT
Start: 2024-08-23

## 2024-08-23 NOTE — PATIENT INSTRUCTIONS
Annual physical exam   - patient doing generally well at this time.  Taking medications as directed, does have borderline high pressure and sometimes gets headaches with this elevated pressure.  Eating well.  Could do better with exercise.    Essential hypertension  -     amLODIPine (NORVASC) 10 MG tablet; Take 1 tablet (10 mg total) by mouth once daily.  Dispense: 90 tablet; Refill: 3  -    CBC, CMP, lipid, A1c, TSH.  -     atorvastatin (LIPITOR) 80 MG tablet; Take 1 tablet (80 mg total) by mouth once daily.  Dispense: 90 tablet; Refill: 3  -     clopidogreL (PLAVIX) 75 mg tablet; Take 1 tablet (75 mg total) by mouth once daily.  Dispense: 90 tablet; Refill: 3  -     metoprolol succinate (TOPROL-XL) 25 MG 24 hr tablet; Take 1 tablet (25 mg total) by mouth once daily.  Dispense: 90 tablet; Refill: 3   - refilling patient's chronic meds.   - recommend checking labs in 6 months, fasting.    Hypercholesterolemia  -     atorvastatin (LIPITOR) 80 MG tablet; Take 1 tablet (80 mg total) by mouth once daily.  Dispense: 90 tablet; Refill: 3  -     clopidogreL (PLAVIX) 75 mg tablet; Take 1 tablet (75 mg total) by mouth once daily.  Dispense: 90 tablet; Refill: 3  -     metoprolol succinate (TOPROL-XL) 25 MG 24 hr tablet; Take 1 tablet (25 mg total) by mouth once daily.  Dispense: 90 tablet; Refill: 3    Need for vaccination  -     pneumoc 20-albert conj-dip cr(PF) (PREVNAR-20 (PF)) injection Syrg 0.5 mL   - getting PCV vaccine today.    Colon cancer screening  -     Cologuard Screening (Multitarget Stool DNA); Future; Expected date: 08/23/2024   - placing Cologuard order, last Cologuard 3 years ago was normal.    History of coronary artery stent placement  -    CBC, CMP, lipid, A1c, TSH.  -     atorvastatin (LIPITOR) 80 MG tablet; Take 1 tablet (80 mg total) by mouth once daily.  Dispense: 90 tablet; Refill: 3  -     clopidogreL (PLAVIX) 75 mg tablet; Take 1 tablet (75 mg total) by mouth once daily.  Dispense: 90 tablet;  Refill: 3  -     metoprolol succinate (TOPROL-XL) 25 MG 24 hr tablet; Take 1 tablet (25 mg total) by mouth once daily.  Dispense: 90 tablet; Refill: 3    Other long term (current) drug therapy  -    CBC, CMP, lipid, A1c, TSH.

## 2024-08-23 NOTE — PROGRESS NOTES
Subjective:           Patient ID: Betty Farr   Age:  69 y.o.  Sex: female     Chief Complaint:   Follow-up (6 month (HTN))      History of Present Illness:    Betty Farr is a 69 y.o. female who presents today with a chief complaint of Follow-up (6 month (HTN))  .    States that she has not been sleeping well.    Will be up until 2-3 AM some nights and not sure why, at times is on the phone or watching TV but other days is just laying.    Then gets up at 5:30AM due to habits.  Does not need to be up for work, but does help get her granddaughter up for her work at 8AM.     At times during day may fall asleep and think it was 2-3 hours later, but it's always only 20 minutes that have passed.    Once sleeping does not wake much.  Is not very tired during the day.  Gets some HA that could be from HTN but could be from sleep deprivation.     Gets along with her granddaughter.  They have a good relationship.  While she is at work patient is inside watching TV, too hot to get out.     Eats salads, red beans/rice, roast, some pork chops.  Often eats breakfast then not again until dinner.    States does walk a lot, walks 8-9 blocks from house to the [facility] every Saturday.   Granddaughter is DM, so gets her to walk to.       Review of Systems   Constitutional:  Negative for activity change, fatigue, fever and unexpected weight change.   HENT:  Negative for congestion, nosebleeds, sinus pressure and sneezing.    Respiratory:  Negative for cough, shortness of breath and wheezing.    Cardiovascular:  Negative for chest pain, palpitations and leg swelling.   Gastrointestinal:  Negative for abdominal distention, constipation, diarrhea and nausea.   Genitourinary:  Negative for difficulty urinating and dysuria.   Musculoskeletal:  Negative for back pain and gait problem.   Skin:  Negative for pallor and rash.   Neurological:  Negative for weakness, numbness and headaches.   Psychiatric/Behavioral:  Negative for  "agitation. The patient is not nervous/anxious.            Objective:        Vitals:    08/23/24 0913   BP: 134/72   BP Location: Left arm   Patient Position: Sitting   BP Method: Medium (Manual)   Pulse: 69   Resp: 19   SpO2: 97%   Weight: 67.9 kg (149 lb 11.1 oz)   Height: 5' 2" (1.575 m)       Body mass index is 27.38 kg/m².      Physical Exam  Vitals reviewed.   Constitutional:       General: She is not in acute distress.     Appearance: Normal appearance. She is normal weight.   HENT:      Head: Normocephalic and atraumatic.      Right Ear: External ear normal.      Left Ear: External ear normal.      Nose: No rhinorrhea.      Mouth/Throat:      Mouth: Mucous membranes are moist.   Eyes:      Extraocular Movements: Extraocular movements intact.      Conjunctiva/sclera: Conjunctivae normal.   Cardiovascular:      Rate and Rhythm: Normal rate and regular rhythm.      Heart sounds: No murmur heard.  Pulmonary:      Effort: Pulmonary effort is normal. No respiratory distress.   Abdominal:      Tenderness: There is no right CVA tenderness or left CVA tenderness.   Musculoskeletal:      Right lower leg: No edema.      Left lower leg: No edema.   Lymphadenopathy:      Cervical: No cervical adenopathy.   Skin:     Capillary Refill: Capillary refill takes less than 2 seconds.      Coloration: Skin is not jaundiced.      Findings: No bruising.   Neurological:      General: No focal deficit present.      Mental Status: She is alert and oriented to person, place, and time.      Motor: No weakness.      Gait: Gait normal.   Psychiatric:         Mood and Affect: Mood normal.           No past medical history on file.    Lab Results   Component Value Date     11/27/2023    K 4.4 11/27/2023     11/27/2023    CO2 25 11/27/2023    BUN 15 11/27/2023    CREATININE 0.8 11/27/2023    ANIONGAP 13 11/27/2023     Lab Results   Component Value Date    HGBA1C 5.4 11/27/2023     No results found for: "BNP", " ""BNPTRIAGEBLO"    Lab Results   Component Value Date    WBC 6.36 11/27/2023    HGB 12.7 11/27/2023    HCT 40.2 11/27/2023     11/27/2023    GRAN 2.4 11/27/2023    GRAN 38.3 11/27/2023     Lab Results   Component Value Date    CHOL 112 (L) 11/27/2023    HDL 54 11/27/2023    LDLCALC 46.0 (L) 11/27/2023    TRIG 60 11/27/2023        Outpatient Encounter Medications as of 8/23/2024   Medication Sig Dispense Refill    aspirin 81 MG Chew Take 81 mg by mouth once daily.      isosorbide mononitrate (IMDUR) 60 MG 24 hr tablet Take 1 tablet (60 mg total) by mouth once daily. 90 tablet 1    ranolazine (RANEXA) 500 MG Tb12 Take 1 tablet (500 mg total) by mouth Daily. 90 tablet 3    [DISCONTINUED] amLODIPine (NORVASC) 5 MG tablet Take 1 tablet (5 mg total) by mouth once daily. 30 tablet 11    [DISCONTINUED] atorvastatin (LIPITOR) 80 MG tablet Take 1 tablet (80 mg total) by mouth once daily. 90 tablet 3    [DISCONTINUED] clopidogreL (PLAVIX) 75 mg tablet Take 1 tablet (75 mg total) by mouth once daily. 90 tablet 3    [DISCONTINUED] metoprolol succinate (TOPROL-XL) 25 MG 24 hr tablet Take 1 tablet (25 mg total) by mouth once daily. 90 tablet 3    amLODIPine (NORVASC) 10 MG tablet Take 1 tablet (10 mg total) by mouth once daily. 90 tablet 3    atorvastatin (LIPITOR) 80 MG tablet Take 1 tablet (80 mg total) by mouth once daily. 90 tablet 3    clopidogreL (PLAVIX) 75 mg tablet Take 1 tablet (75 mg total) by mouth once daily. 90 tablet 3    metoprolol succinate (TOPROL-XL) 25 MG 24 hr tablet Take 1 tablet (25 mg total) by mouth once daily. 90 tablet 3     Facility-Administered Encounter Medications as of 8/23/2024   Medication Dose Route Frequency Provider Last Rate Last Admin    pneumoc 20-albert conj-dip cr(PF) (PREVNAR-20 (PF)) injection Syrg 0.5 mL  0.5 mL Intramuscular 1 time in Clinic/HOD               Assessment:       1. Annual physical exam    2. Essential hypertension    3. Hypercholesterolemia    4. Need for vaccination  "   5. Colon cancer screening    6. History of coronary artery stent placement    7. Other long term (current) drug therapy           Plan:         Annual physical exam   - patient doing generally well at this time.  Taking medications as directed, does have borderline high pressure and sometimes gets headaches with this elevated pressure.  Eating well.  Could do better with exercise.    Essential hypertension  -     amLODIPine (NORVASC) 10 MG tablet; Take 1 tablet (10 mg total) by mouth once daily.  Dispense: 90 tablet; Refill: 3  -    CBC, CMP, lipid, A1c, TSH.  -     atorvastatin (LIPITOR) 80 MG tablet; Take 1 tablet (80 mg total) by mouth once daily.  Dispense: 90 tablet; Refill: 3  -     clopidogreL (PLAVIX) 75 mg tablet; Take 1 tablet (75 mg total) by mouth once daily.  Dispense: 90 tablet; Refill: 3  -     metoprolol succinate (TOPROL-XL) 25 MG 24 hr tablet; Take 1 tablet (25 mg total) by mouth once daily.  Dispense: 90 tablet; Refill: 3   - refilling patient's chronic meds.   - recommend checking labs in 6 months, fasting.    Hypercholesterolemia  -     atorvastatin (LIPITOR) 80 MG tablet; Take 1 tablet (80 mg total) by mouth once daily.  Dispense: 90 tablet; Refill: 3  -     clopidogreL (PLAVIX) 75 mg tablet; Take 1 tablet (75 mg total) by mouth once daily.  Dispense: 90 tablet; Refill: 3  -     metoprolol succinate (TOPROL-XL) 25 MG 24 hr tablet; Take 1 tablet (25 mg total) by mouth once daily.  Dispense: 90 tablet; Refill: 3    Need for vaccination  -     pneumoc 20-albert conj-dip cr(PF) (PREVNAR-20 (PF)) injection Syrg 0.5 mL   - getting PCV vaccine today.    Colon cancer screening  -     Cologuard Screening (Multitarget Stool DNA); Future; Expected date: 08/23/2024   - placing Cologuard order, last Cologuard 3 years ago was normal.    History of coronary artery stent placement  -    CBC, CMP, lipid, A1c, TSH.  -     atorvastatin (LIPITOR) 80 MG tablet; Take 1 tablet (80 mg total) by mouth once daily.   Dispense: 90 tablet; Refill: 3  -     clopidogreL (PLAVIX) 75 mg tablet; Take 1 tablet (75 mg total) by mouth once daily.  Dispense: 90 tablet; Refill: 3  -     metoprolol succinate (TOPROL-XL) 25 MG 24 hr tablet; Take 1 tablet (25 mg total) by mouth once daily.  Dispense: 90 tablet; Refill: 3    Other long term (current) drug therapy  -    CBC, CMP, lipid, A1c, TSH.

## 2024-08-23 NOTE — PROGRESS NOTES
Verified pt ID using name and . NDKA. Administered prevnar 20 in left deltoid per physician order using aseptic technique.  Pt tolerated well with no adverse reactions noted.

## 2024-10-03 DIAGNOSIS — E78.00 HYPERCHOLESTEROLEMIA: ICD-10-CM

## 2024-10-03 DIAGNOSIS — I10 ESSENTIAL HYPERTENSION: ICD-10-CM

## 2024-10-03 DIAGNOSIS — Z95.5 HISTORY OF CORONARY ARTERY STENT PLACEMENT: ICD-10-CM

## 2024-10-03 NOTE — TELEPHONE ENCOUNTER
Care Due:                  Date            Visit Type   Department     Provider  --------------------------------------------------------------------------------                                EP -                              PRIMARY [facility]  Last Visit: 08-      CARE ([facility])   PRIMARY CARE   Harsha Araujo                              EP - [facility]IMARY [facility]  Next Visit: 10-      CARE ([facility])   PRIMARY CARE   Harsha Araujo                                                            Last  Test          Frequency    Reason                     Performed    Due Date  --------------------------------------------------------------------------------    CBC.........  12 months..  clopidogreL..............  11- 11-    CMP.........  12 months..  atorvastatin.............  11- 11-    Lipid Panel.  12 months..  atorvastatin.............  11- 11-    [facility] Embedded Care Due Messages. Reference number: [id number].   10/03/2024 4:02:50 PM CDT

## 2024-10-03 NOTE — TELEPHONE ENCOUNTER
----- Message from Guillermina sent at 10/3/2024  1:46 PM CDT -----  Contact: Mobile  [phone]  Requesting an RX refill or new RX.    Is this a refill or new RX: Refill    RX name and strength clopidogreL (PLAVIX) 75 mg tablet    Is this a 30 day or 90 day RX: 90    Pharmacy name and phone #   [id number] - [place], [place] - [address] AT [address]  [address] [facility] [address]  Phone: [phone] Fax: [phone]        The doctors have asked that we provide their patients with the following 2 reminders -- prescription refills can take up to 72 hours, and a friendly reminder that in the future you can use your [facility] account to request refills:   Comment: Patient said that she need a Prior authorization for her clopidogreL (PLAVIX) 75 mg tablet script.

## 2024-10-04 DIAGNOSIS — Z95.5 HISTORY OF CORONARY ARTERY STENT PLACEMENT: ICD-10-CM

## 2024-10-04 DIAGNOSIS — E78.00 HYPERCHOLESTEROLEMIA: ICD-10-CM

## 2024-10-04 DIAGNOSIS — I10 ESSENTIAL HYPERTENSION: ICD-10-CM

## 2024-10-04 RX ORDER — CLOPIDOGREL BISULFATE 75 MG/1
75 TABLET ORAL DAILY
Qty: 90 TABLET | Refills: 3 | OUTPATIENT
Start: 2024-10-04

## 2024-10-04 RX ORDER — CLOPIDOGREL BISULFATE 75 MG/1
75 TABLET ORAL DAILY
Qty: 90 TABLET | Refills: 0 | OUTPATIENT
Start: 2024-10-04

## 2024-10-04 NOTE — TELEPHONE ENCOUNTER
Refill Decision Note   Betty Farr  is requesting a refill authorization.  Brief Assessment and Rationale for Refill:  Quick Discontinue     Medication Therapy Plan:  (Ordering User: Iesha Davis MA) Refused Today (10/4/2024), Patient needs an appointment      Comments:     Note composed:5:16 PM 10/04/2024

## 2024-10-04 NOTE — TELEPHONE ENCOUNTER
No care due was identified.  [facility] Embedded Care Due Messages. Reference number: [id number].   10/04/2024 8:06:34 AM CDT

## 2024-10-21 ENCOUNTER — TELEPHONE (OUTPATIENT)
Dept: CARDIOLOGY | Facility: CLINIC | Age: 69
End: 2024-10-21
Payer: MEDICARE

## 2024-10-21 NOTE — TELEPHONE ENCOUNTER
Spoke with patient, informed that Dr. Mendoza is resigning at the end of October.  I offered to reschedule appointment with NATASHA Brown or a MD at another location, patient declined.  Patient states she will find another doctor.

## 2024-10-21 NOTE — TELEPHONE ENCOUNTER
----- Message from Zay sent at 10/21/2024  2:31 PM CDT -----  Regarding: Returning a Missed Call  Contact: [phone]  Returning a Missed Call     Caller: Betty Farr        Returning call to:  Malia Downs     Caller can be reached @:  [phone]     Nature of the call: Returning call to reschedule appt

## 2024-10-22 DIAGNOSIS — I25.119 ATHEROSCLEROSIS OF NATIVE CORONARY ARTERY WITH ANGINA PECTORIS, UNSPECIFIED WHETHER NATIVE OR TRANSPLANTED HEART: ICD-10-CM

## 2024-10-22 RX ORDER — ISOSORBIDE MONONITRATE 60 MG/1
TABLET, EXTENDED RELEASE ORAL
Qty: 90 TABLET | Refills: 1 | OUTPATIENT
Start: 2024-10-22

## 2024-10-22 NOTE — TELEPHONE ENCOUNTER
"LOV 07/19/2024    New prescription of isosorbide sent on 07/19/2024, #90 with 1 refill.  Refill request too soon.  Patient due for 3 month follow up in clinic.      Spoke with patient yesterday to reschedule follow up with NATASHA Brown.  Patient declined.  She did not want to schedule with a provider at another location.  She said, "I will find another doctor".  "

## 2024-10-30 ENCOUNTER — TELEPHONE (OUTPATIENT)
Dept: PRIMARY CARE CLINIC | Facility: CLINIC | Age: 69
End: 2024-10-30
Payer: MEDICARE

## 2024-10-30 ENCOUNTER — TELEPHONE (OUTPATIENT)
Dept: CARDIOLOGY | Facility: CLINIC | Age: 69
End: 2024-10-30
Payer: MEDICARE

## 2024-11-28 DIAGNOSIS — Z95.5 HISTORY OF CORONARY ARTERY STENT PLACEMENT: ICD-10-CM

## 2024-11-28 DIAGNOSIS — I10 ESSENTIAL HYPERTENSION: ICD-10-CM

## 2024-11-29 RX ORDER — RANOLAZINE 500 MG/1
TABLET, EXTENDED RELEASE ORAL
Qty: 90 TABLET | Refills: 3 | Status: SHIPPED | OUTPATIENT
Start: 2024-11-29

## 2024-12-06 DIAGNOSIS — I10 ESSENTIAL HYPERTENSION: ICD-10-CM

## 2024-12-06 DIAGNOSIS — Z95.5 HISTORY OF CORONARY ARTERY STENT PLACEMENT: ICD-10-CM

## 2024-12-06 DIAGNOSIS — E78.00 HYPERCHOLESTEROLEMIA: ICD-10-CM

## 2024-12-06 RX ORDER — CLOPIDOGREL BISULFATE 75 MG/1
75 TABLET ORAL DAILY
Qty: 90 TABLET | Refills: 3 | Status: SHIPPED | OUTPATIENT
Start: 2024-12-06

## 2024-12-06 NOTE — TELEPHONE ENCOUNTER
----- Message from Cesar sent at 12/6/2024 11:47 AM CST -----  Contact: [phone]@patient  Requesting an RX refill or new RX.clopidogreL (PLAVIX) 75 mg tablet    Is this a refill or new RX: refill     RX name and strength (copy/paste from chart):      Is this a 30 day or 90 day RX:     Pharmacy name and phone #   [id number] - [place], [place] - [address] AT [address]        The doctors have asked that we provide their patients with the following 2 reminders -- prescription refills can take up to 72 hours, and a friendly reminder that in the future you can use your [facility] account to request refills:

## 2024-12-06 NOTE — TELEPHONE ENCOUNTER
Care Due:                  Date            Visit Type   Department     Provider  --------------------------------------------------------------------------------                                EP -                              [facility]IMARY [facility]  Last Visit: 08-      CARE ([facility])   PRIMARY CARE   Harsha Araujo                              EP - [facility]  Next Visit: 02-      CARE ([facility])   PRIMARY CARE   Harsha Araujo                                                            Last  Test          Frequency    Reason                     Performed    Due Date  --------------------------------------------------------------------------------    CBC.........  12 months..  clopidogreL..............  11- 11-    CMP.........  12 months..  atorvastatin.............  11- 11-    Lipid Panel.  12 months..  atorvastatin.............  11- 11-    [facility] Embedded Care Due Messages. Reference number: [id number].   12/06/2024 12:00:29 PM CST

## 2024-12-29 DIAGNOSIS — I10 ESSENTIAL HYPERTENSION: ICD-10-CM

## 2024-12-29 DIAGNOSIS — E78.00 HYPERCHOLESTEROLEMIA: ICD-10-CM

## 2024-12-29 DIAGNOSIS — Z95.5 HISTORY OF CORONARY ARTERY STENT PLACEMENT: ICD-10-CM

## 2024-12-29 RX ORDER — ISOSORBIDE MONONITRATE 30 MG/1
TABLET, EXTENDED RELEASE ORAL
Qty: 90 TABLET | Refills: 3 | OUTPATIENT
Start: 2024-12-29

## 2024-12-29 NOTE — TELEPHONE ENCOUNTER
No care due was identified.  [facility] Embedded Care Due Messages. Reference number: [id number].   12/29/2024 4:25:26 AM CST

## 2024-12-29 NOTE — TELEPHONE ENCOUNTER
Refill Decision Note   Betty Farr  is requesting a refill authorization.  Brief Assessment and Rationale for Refill:  Quick Discontinue     Medication Therapy Plan: The original prescription was discontinued on 11/30/2023 by Balbina Grijalva DNP ; 60 MG      Comments:     Note composed:9:21 AM 12/29/2024

## 2024-12-30 DIAGNOSIS — Z95.5 HISTORY OF CORONARY ARTERY STENT PLACEMENT: ICD-10-CM

## 2024-12-30 DIAGNOSIS — I10 ESSENTIAL HYPERTENSION: ICD-10-CM

## 2024-12-30 DIAGNOSIS — E78.00 HYPERCHOLESTEROLEMIA: ICD-10-CM

## 2024-12-30 NOTE — TELEPHONE ENCOUNTER
No care due was identified.  [facility] Embedded Care Due Messages. Reference number: [id number].   12/30/2024 9:38:11 AM CST

## 2024-12-30 NOTE — TELEPHONE ENCOUNTER
----- Message from Estefani sent at 12/30/2024  9:03 AM CST -----  Contact: [phone] Patient  Requesting an RX refill or new RX.    Is this a refill or new RX: new    RX name and strength (copy/paste from chart):  clopidogreL (PLAVIX) 75 mg tablet    Is this a 30 day or 90 day RX: 90    Pharmacy name and phone # (copy/paste from chart):    [id number] - [place], [place] - [address] AT [address]  [address] [facility] [address]  Phone: [phone] Fax: [phone]       The doctors have asked that we provide their patients with the following 2 reminders -- prescription refills can take up to 72 hours, and a friendly reminder that in the future you can use your [facility] account to request refills: yes

## 2024-12-31 RX ORDER — CLOPIDOGREL BISULFATE 75 MG/1
75 TABLET ORAL DAILY
Qty: 90 TABLET | Refills: 3 | Status: SHIPPED | OUTPATIENT
Start: 2024-12-31

## 2024-12-31 NOTE — TELEPHONE ENCOUNTER
Refill Routing Note   Medication(s) are not appropriate for processing by [facility] for the following reason(s):        Required labs outdated    [facility] action(s):  Defer               Appointments  past 12m or future 3m with PCP    Date Provider   Last Visit   10/18/2024 Harsha Araujo MD   Next Visit   2/27/2025 Harsha Araujo MD   ED visits in past 90 days: 0        Note composed:11:19 PM 12/30/2024

## 2025-01-16 DIAGNOSIS — I10 ESSENTIAL HYPERTENSION: ICD-10-CM

## 2025-01-16 DIAGNOSIS — Z95.5 HISTORY OF CORONARY ARTERY STENT PLACEMENT: ICD-10-CM

## 2025-01-16 DIAGNOSIS — E78.00 HYPERCHOLESTEROLEMIA: ICD-10-CM

## 2025-01-16 RX ORDER — METOPROLOL SUCCINATE 25 MG/1
25 TABLET, EXTENDED RELEASE ORAL
Qty: 90 TABLET | Refills: 2 | Status: SHIPPED | OUTPATIENT
Start: 2025-01-16

## 2025-01-16 RX ORDER — ATORVASTATIN CALCIUM 80 MG/1
80 TABLET, FILM COATED ORAL
Qty: 90 TABLET | Refills: 0 | Status: SHIPPED | OUTPATIENT
Start: 2025-01-16

## 2025-01-16 NOTE — TELEPHONE ENCOUNTER
No care due was identified.  [facility] Embedded Care Due Messages. Reference number: [id number].   1/16/2025 4:27:03 AM CST

## 2025-01-16 NOTE — TELEPHONE ENCOUNTER
Refill Routing Note   Medication(s) are not appropriate for processing by [facility] for the following reason(s):        Required labs outdated  Drug-disease interaction    [facility] action(s):  Defer      Medication Therapy Plan: Drug-Disease: metoprolol succinate and Peripheral vascular disease, unspecified    Pharmacist review requested: Yes     Appointments  past 12m or future 3m with PCP    Date Provider   Last Visit   10/18/2024 Harsha Araujo MD   Next Visit   2/27/2025 Harsha Araujo MD   ED visits in past 90 days: 0        Note composed:9:19 AM 01/16/2025

## 2025-01-16 NOTE — TELEPHONE ENCOUNTER
Refill Routing Note   Medication(s) are not appropriate for processing by [facility] for the following reason(s):        Required labs outdated    [facility] action(s):  Defer  Approve           Pharmacist review requested: Yes   Extended chart review required: Yes     Appointments  past 12m or future 3m with PCP    Date Provider   Last Visit   10/18/2024 Harsha Araujo MD   Next Visit   2/27/2025 Harsha Araujo MD   ED visits in past 90 days: 0        Note composed:11:31 AM 01/16/2025

## 2025-02-24 ENCOUNTER — TELEPHONE (OUTPATIENT)
Dept: PRIMARY CARE CLINIC | Facility: CLINIC | Age: 70
End: 2025-02-24
Payer: MEDICARE

## 2025-02-24 NOTE — TELEPHONE ENCOUNTER
----- Message from Dianna sent at 2/24/2025  3:59 PM CST -----  Contact: [phone]  Type: Orders RequestWhat orders/ testing are being requested? CT Lung scan & mammogramIs there a future appointment scheduled for the patient with PCP? yesWhen? 2/27Would you prefer a response via [employer]? callComments:

## 2025-02-25 ENCOUNTER — TELEPHONE (OUTPATIENT)
Dept: PRIMARY CARE CLINIC | Facility: CLINIC | Age: 70
End: 2025-02-25
Payer: MEDICARE

## 2025-02-25 DIAGNOSIS — Z12.31 OTHER SCREENING MAMMOGRAM: ICD-10-CM

## 2025-02-25 DIAGNOSIS — Z12.31 ENCOUNTER FOR SCREENING MAMMOGRAM FOR BREAST CANCER: ICD-10-CM

## 2025-02-25 DIAGNOSIS — Z12.39 ENCOUNTER FOR SCREENING FOR MALIGNANT NEOPLASM OF BREAST, UNSPECIFIED SCREENING MODALITY: Primary | ICD-10-CM

## 2025-02-25 NOTE — TELEPHONE ENCOUNTER
----- Message from Harsha Araujo MD sent at 2/25/2025 11:39 AM CST -----  Regarding: RE: order  There are written order guildlines that allow nursing to order mammograms themselves. It will need to be forward dated until 3/7/25.Then can be signed and sent to me for a co-sign.- SB  ----- Message -----  From: Sourav Guillen LPN  Sent: 2/25/2025  10:51 AM CST  To: Harsha Araujo MD  Subject: FW: order                                          ----- Message -----  From: Karen Boyle  Sent: 2/25/2025   7:14 AM CST  To: [facility]  Subject: order                                            Patient is requesting an order for her yearly mammogram due after 3/7/25 Ananth

## 2025-02-27 ENCOUNTER — TELEPHONE (OUTPATIENT)
Dept: PRIMARY CARE CLINIC | Facility: CLINIC | Age: 70
End: 2025-02-27
Payer: MEDICARE

## 2025-02-27 NOTE — TELEPHONE ENCOUNTER
Left a message for Brianna with [facility] to give her our other fax number [phone].  I have not received any fax from her as of yet.

## 2025-02-27 NOTE — TELEPHONE ENCOUNTER
----- Message from Imani sent at 2/25/2025 10:30 AM CST -----  Contact: Brianna with [employer]   .1MEDICALADVICE Patient is calling for Medical Advice regarding:Checking to see if fax has been received with updated medication list How long has patient had these symptoms:N/aPharmacy name and phone#:N/aPatient wants a call back or thru [facility]:CallbackComments:fax # [phone] advise patient replies from provider may take up to 48 hours.

## 2025-02-27 NOTE — TELEPHONE ENCOUNTER
Spoke to patient to let her know I di not receive a fax from [facility].  She states she has an appointment today with [facility] at 3:00.  I told her to just bring all of her meds with her.

## 2025-02-27 NOTE — TELEPHONE ENCOUNTER
----- Message from Estefani sent at 2/27/2025  9:47 AM CST -----  Contact: [phone] Brianna with [employer]  Patient would like to get medical advice.Symptoms (please be specific):   Brianna is following up on a fax sent to  on 02/25/2025 for an updated med list for the pt. Brianna states the list can be faxed to [phone] long have you had these symptoms: N/AWould you like a call back, or a response through your [facility] portal?:   call back if neededPharmacy name and phone # (copy from chart):   N/AComments:

## 2025-03-06 ENCOUNTER — TELEPHONE (OUTPATIENT)
Dept: PRIMARY CARE CLINIC | Facility: CLINIC | Age: 70
End: 2025-03-06
Payer: MEDICARE

## 2025-03-06 NOTE — TELEPHONE ENCOUNTER
----- Message from Nicki sent at 3/5/2025  3:09 PM CST -----  Contact: Roldan with [facility] phone [phone]  .1MEDICALADVICE Patient is calling for Medical Advice regarding:Calling to again to inquire about the form that was faxed.How long has patient had these symptoms: N/APharmacy name and phone#: N/APatient wants a call back or thru [facility]: Call backComments: Please advise. Thanks.Please advise patient replies from provider may take up to 48 hours.

## 2025-03-06 NOTE — TELEPHONE ENCOUNTER
----- Message from Nicole sent at 3/5/2025  9:11 AM CST -----  Contact: Brianna [phone]  .1MEDICALADVICE Patient is calling for Medical Advice regarding: Ms. Reed with [employer] is calling to verify status about a fax that was sent over 02/27/2025. Fax number [phone].Patient wants a call back or thru [facility]: call back Comments:Please advise patient replies from provider may take up to 48 hours.

## 2025-03-06 NOTE — TELEPHONE ENCOUNTER
----- Message from Laim sent at 3/6/2025 12:28 PM CST -----  Contact: Brianna with [employer]   .1MEDICALADVICE Patient is calling for Medical Advice regarding:Brianna with [facility] checking to see if fax has been received with updated medication list Please fax to [phone] long has patient had these symptoms:Pharmacy name and phone#:Patient wants a call back or thru [facility]:Comments:Please advise patient replies from provider may take up to 48 hours.